# Patient Record
Sex: MALE | Race: WHITE | NOT HISPANIC OR LATINO | Employment: FULL TIME | URBAN - METROPOLITAN AREA
[De-identification: names, ages, dates, MRNs, and addresses within clinical notes are randomized per-mention and may not be internally consistent; named-entity substitution may affect disease eponyms.]

---

## 2017-01-06 ENCOUNTER — OFFICE VISIT (OUTPATIENT)
Dept: FAMILY MEDICINE CLINIC | Facility: CLINIC | Age: 62
End: 2017-01-06

## 2017-01-06 VITALS
HEIGHT: 70 IN | WEIGHT: 190 LBS | OXYGEN SATURATION: 98 % | HEART RATE: 110 BPM | BODY MASS INDEX: 27.2 KG/M2 | SYSTOLIC BLOOD PRESSURE: 118 MMHG | DIASTOLIC BLOOD PRESSURE: 70 MMHG

## 2017-01-06 DIAGNOSIS — M62.831 MUSCLE SPASM OF CALF: Primary | ICD-10-CM

## 2017-01-06 PROCEDURE — 99213 OFFICE O/P EST LOW 20 MIN: CPT | Performed by: FAMILY MEDICINE

## 2017-01-06 RX ORDER — CYCLOBENZAPRINE HCL 10 MG
10 TABLET ORAL NIGHTLY PRN
Qty: 30 TABLET | Refills: 1 | Status: SHIPPED | OUTPATIENT
Start: 2017-01-06 | End: 2018-01-08

## 2017-01-06 NOTE — MR AVS SNAPSHOT
Jimi De La Vega   1/6/2017 1:30 PM   Office Visit    Provider:  Neal Jalloh MD   Department:  [facility]   Dept Phone:  [phone]                Your Full Care Plan              Today's Medication Changes          These changes are accurate as of: 1/6/17  1:41 PM.  If you have any questions, ask your nurse or doctor.               New Medication(s)Ordered:     cyclobenzaprine 10 MG tablet   Commonly known as:  FLEXERIL   Take 1 tablet by mouth At Night As Needed for muscle spasms.            Where to Get Your Medications      These medications were sent to [address] - [address] [address] - [phone]  - [phone]   [address] [facility] [address], [facility] [address]     Phone:  [phone]     cyclobenzaprine 10 MG tablet                  Your Updated Medication List          This list is accurate as of: 1/6/17  1:41 PM.  Always use your most recent med list.                cyclobenzaprine 10 MG tablet   Commonly known as:  FLEXERIL   Take 1 tablet by mouth At Night As Needed for muscle spasms.       hydroxychloroquine 200 MG tablet   Commonly known as:  PLAQUENIL               You Were Diagnosed With        Codes Comments    Muscle spasm of calf    -  Primary ICD-10-CM: M62.831  ICD-9-CM: 728.85       Instructions     None    Patient Instructions History      [employer] Signup     Our records indicate that you have an active [email or web address] account.    You can view your After Visit Summary by going to [facility] and logging in with your [employer] username and password.  If you don't have a [employer] username and password but a parent or guardian has access to your record, the parent or guardian should login with their own [employer] username and password and access your record to view the After Visit Summary.    If you have questions, you can email [email or web address] or call [phone] to talk to our  "MyChart staff.  Remember, MyChart is NOT to be used for urgent needs.  For medical emergencies, dial 911.               Other Info from Your Visit           Allergies     No Known Allergies      Reason for Visit     Spasms C/o right leg spasm from knee to calf muscle. Only happens at night x 3 weeks.      Vital Signs     Blood Pressure Pulse Height Weight Oxygen Saturation Body Mass Index    118/70 110 70\" (177.8 cm) 190 lb (86.2 kg) 98% 27.26 kg/m2    Smoking Status                   Never Smoker           Problems and Diagnoses Noted     Muscle spasm of calf      "

## 2017-01-06 NOTE — PROGRESS NOTES
Subjective   Jimi De La Vega is a 61 y.o. male who is here for   Chief Complaint   Patient presents with   • Spasms     C/o right leg spasm from knee to calf muscle. Only happens at night x 3 weeks.   .     History of Present Illness   Has a tight jolt spasm at night for 1 month right lateral calf muscle    Is a runner  No injury  His overall RLS is nearly resolved since he has been running.    The following portions of the patient's history were reviewed and updated as appropriate: allergies, current medications, past family history, past medical history, past social history, past surgical history and problem list.    Review of Systems    Objective   Physical Exam   Cardiovascular: Intact distal pulses.    Musculoskeletal:        Right knee: Normal.        Right ankle: Normal.        Right lower leg: He exhibits tenderness. He exhibits no bony tenderness, no swelling and no edema.        Legs:  Nursing note and vitals reviewed.      Assessment/Plan   Jimi was seen today for spasms.    Diagnoses and all orders for this visit:    Muscle spasm of calf  -     cyclobenzaprine (FLEXERIL) 10 MG tablet; Take 1 tablet by mouth At Night As Needed for muscle spasms.      Patient Instructions   Take the flexeril 1 h prior to bed with a small bottle to tonic water.      There are no discontinued medications.     Return if symptoms worsen or fail to improve.    Dr. Neal Jalloh  [place], [place]

## 2017-02-06 DIAGNOSIS — Z00.00 ROUTINE ADULT HEALTH MAINTENANCE: Primary | ICD-10-CM

## 2017-02-06 DIAGNOSIS — Z12.5 SCREENING FOR PROSTATE CANCER: ICD-10-CM

## 2017-02-07 ENCOUNTER — LAB (OUTPATIENT)
Dept: FAMILY MEDICINE CLINIC | Facility: CLINIC | Age: 62
End: 2017-02-07

## 2017-02-07 DIAGNOSIS — Z12.5 SCREENING FOR PROSTATE CANCER: ICD-10-CM

## 2017-02-07 DIAGNOSIS — Z00.00 ROUTINE ADULT HEALTH MAINTENANCE: ICD-10-CM

## 2017-02-07 LAB
ALBUMIN SERPL-MCNC: 4.8 G/DL (ref 3.5–5.2)
ALBUMIN/GLOB SERPL: 2.2 G/DL
ALP SERPL-CCNC: 63 U/L (ref 40–129)
ALT SERPL-CCNC: 29 U/L (ref 5–41)
AST SERPL-CCNC: 31 U/L (ref 5–40)
BILIRUB SERPL-MCNC: 1 MG/DL (ref 0.2–1.2)
BUN SERPL-MCNC: 14 MG/DL (ref 8–23)
BUN/CREAT SERPL: 13.3 (ref 7–25)
CALCIUM SERPL-MCNC: 9.6 MG/DL (ref 8.8–10.5)
CHLORIDE SERPL-SCNC: 101 MMOL/L (ref 98–107)
CHOLEST SERPL-MCNC: 210 MG/DL (ref 0–200)
CO2 SERPL-SCNC: 29.9 MMOL/L (ref 22–29)
CREAT SERPL-MCNC: 1.05 MG/DL (ref 0.76–1.27)
ERYTHROCYTE [DISTWIDTH] IN BLOOD BY AUTOMATED COUNT: 11.9 % (ref 11.5–14.5)
GLOBULIN SER CALC-MCNC: 2.2 GM/DL
GLUCOSE SERPL-MCNC: 89 MG/DL (ref 65–99)
HCT VFR BLD AUTO: 48.5 % (ref 42–52)
HDLC SERPL-MCNC: 98 MG/DL (ref 40–60)
HGB BLD-MCNC: 16.4 G/DL (ref 14–18)
LDLC SERPL CALC-MCNC: 98 MG/DL (ref 0–100)
MCH RBC QN AUTO: 30.1 PG (ref 27–31)
MCHC RBC AUTO-ENTMCNC: 33.8 G/DL (ref 31–37)
MCV RBC AUTO: 89 FL (ref 80–94)
PLATELET # BLD AUTO: 224 10*3/MM3 (ref 140–500)
POTASSIUM SERPL-SCNC: 4.3 MMOL/L (ref 3.5–5.2)
PROT SERPL-MCNC: 7 G/DL (ref 6–8.5)
PSA SERPL-MCNC: 0.73 NG/ML (ref 0–4)
RBC # BLD AUTO: 5.45 10*6/MM3 (ref 4.7–6.1)
SODIUM SERPL-SCNC: 143 MMOL/L (ref 136–145)
TRIGL SERPL-MCNC: 72 MG/DL (ref 0–150)
VLDLC SERPL CALC-MCNC: 14.4 MG/DL (ref 8–32)
WBC # BLD AUTO: 6.4 10*3/MM3 (ref 4.8–10.8)

## 2017-02-14 ENCOUNTER — OFFICE VISIT (OUTPATIENT)
Dept: FAMILY MEDICINE CLINIC | Facility: CLINIC | Age: 62
End: 2017-02-14

## 2017-02-14 VITALS
DIASTOLIC BLOOD PRESSURE: 66 MMHG | RESPIRATION RATE: 20 BRPM | TEMPERATURE: 98.9 F | OXYGEN SATURATION: 98 % | WEIGHT: 188 LBS | HEIGHT: 70 IN | SYSTOLIC BLOOD PRESSURE: 108 MMHG | BODY MASS INDEX: 26.92 KG/M2 | HEART RATE: 78 BPM

## 2017-02-14 DIAGNOSIS — Z00.00 ROUTINE ADULT HEALTH MAINTENANCE: Primary | ICD-10-CM

## 2017-02-14 DIAGNOSIS — B35.4 TINEA CORPORIS: ICD-10-CM

## 2017-02-14 DIAGNOSIS — Z12.11 SCREENING FOR COLON CANCER: ICD-10-CM

## 2017-02-14 LAB — HEMOCCULT STL QL IA: NEGATIVE

## 2017-02-14 PROCEDURE — 82274 ASSAY TEST FOR BLOOD FECAL: CPT | Performed by: FAMILY MEDICINE

## 2017-02-14 PROCEDURE — 99396 PREV VISIT EST AGE 40-64: CPT | Performed by: FAMILY MEDICINE

## 2017-02-14 RX ORDER — KETOCONAZOLE 20 MG/G
CREAM TOPICAL EVERY 12 HOURS SCHEDULED
Qty: 30 G | Refills: 1 | Status: SHIPPED | OUTPATIENT
Start: 2017-02-14 | End: 2019-02-14

## 2017-02-14 NOTE — PROGRESS NOTES
"  Chief Complaint   Patient presents with   • Annual Exam     pt is concerened about a spot on his upper thigh and leg pain in right leg.        Subjective   Jimi De La Vega is a 61 y.o. male and is here for a yearly physical exam. The patient reports problems - rheumatoid is stable, running miles 3x a week, new spreading rash on leg.    Do you take any herbs or supplements that were not prescribed by a doctor? no. If so, these will be added to active medication list.    The following portions of the patient's history were reviewed and updated as appropriate: allergies, current medications, past family history, past medical history, past social history, past surgical history and problem list.    Review of Systems  Review of Systems  A comprehensive review of systems was negative.    Physical Exam    Objective   Visit Vitals   • /66 (BP Location: Left arm, Patient Position: Sitting, Cuff Size: Adult)   • Pulse 78   • Temp 98.9 °F (37.2 °C)   • Resp 20   • Ht 70\" (177.8 cm)   • Wt 188 lb (85.3 kg)   • SpO2 98%   • BMI 26.98 kg/m2       General Appearance:    Alert, cooperative, no distress, appears stated age   Head:    Normocephalic, without obvious abnormality, atraumatic   Eyes:    PERRL, conjunctiva/corneas clear, EOM's intact, fundi     benign, both eyes        Ears:    Normal TM's and external ear canals, both ears   Nose:   Nares normal, septum midline, mucosa normal, no drainage    or sinus tenderness   Throat:   Lips, mucosa, and tongue normal; teeth and gums normal   Neck:   Supple, symmetrical, trachea midline, no adenopathy;        thyroid:  No enlargement/tenderness/nodules; no carotid    bruit or JVD   Back:     Symmetric, no curvature, ROM normal, no CVA tenderness   Lungs:     Clear to auscultation bilaterally, respirations unlabored   Chest wall:    No tenderness or deformity   Heart:    Regular rate and rhythm, S1 and S2 normal, no murmur, rub   or gallop   Abdomen:     Soft, non-tender, bowel " sounds active all four quadrants,     no masses, no organomegaly   Genitalia:    Normal male without lesion, discharge or tenderness   Rectal:    Normal tone, normal prostate, no masses or tenderness;    guaiac negative stool   Extremities:   Extremities normal, atraumatic, no cyanosis or edema   Pulses:   2+ and symmetric all extremities   Skin:   Hypopigmentation on hands and knees. No plaques.  5 cm spreading rash right inner thigh   Lymph nodes:   Cervical, supraclavicular, and axillary nodes normal   Neurologic:   CNII-XII intact. Normal strength, sensation and reflexes       throughout          Results for orders placed or performed in visit on 02/14/17   POC FECAL OCCULT BLOOD BY IMMUNOASSAY   Result Value Ref Range    POC OCCULT BLOOD BY IMMUNOASSAY Negative Negative     Assessment/Plan   Healthy male exam.  Jimi was seen today for annual exam.    Diagnoses and all orders for this visit:    Routine adult health maintenance    Screening for colon cancer  -     POC FECAL OCCULT BLOOD BY IMMUNOASSAY    Tinea corporis  -     ketoconazole (NIZORAL) 2 % cream; Apply  topically Every 12 (Twelve) Hours.      1. Labs all look great  2. Patient Counseling:  --Nutrition: Stressed importance of moderation in sodium/caffeine intake, saturated fat and cholesterol.  Discussed caloric balance, sufficient intake of fresh fruits, vegetables, fiber, calcium, iron. Good here  --Discussed the daily use of baby aspirin, if indicated. Not needed  --Exercise: Stressed the importance of regular exercise. Very good here  --Substance Abuse: Discussed cessation/primary prevention of tobacco, alcohol, or other drug use; driving or other dangerous activities under the influence. Could reduce wine   --Dental health: Discussed importance of regular tooth brushing, flossing, and dental visits.utd  --Immunizations reviewed.utd  --Discussed benefits of screening colonoscopy.utd  3. Discussed the patient's BMI with him.  The BMI is in the  acceptable range  4. Follow up in one year   5. UTD on eye exams    There are no discontinued medications.     Dr. Neal Jalloh MD  Family [place], [place]  [facility]

## 2017-07-05 ENCOUNTER — OFFICE VISIT (OUTPATIENT)
Dept: FAMILY MEDICINE CLINIC | Facility: CLINIC | Age: 62
End: 2017-07-05

## 2017-07-05 VITALS
HEIGHT: 70 IN | SYSTOLIC BLOOD PRESSURE: 98 MMHG | TEMPERATURE: 98.3 F | OXYGEN SATURATION: 99 % | DIASTOLIC BLOOD PRESSURE: 64 MMHG | WEIGHT: 190.1 LBS | BODY MASS INDEX: 27.22 KG/M2 | HEART RATE: 61 BPM

## 2017-07-05 DIAGNOSIS — J06.9 ACUTE URI: Primary | ICD-10-CM

## 2017-07-05 PROCEDURE — 99213 OFFICE O/P EST LOW 20 MIN: CPT | Performed by: FAMILY MEDICINE

## 2017-07-05 RX ORDER — AZITHROMYCIN 250 MG/1
TABLET, FILM COATED ORAL
Qty: 6 TABLET | Refills: 0 | Status: SHIPPED | OUTPATIENT
Start: 2017-07-05 | End: 2018-01-08

## 2017-07-05 NOTE — PROGRESS NOTES
Subjective   Jimi De La Vega is a 62 y.o. male who is here for   Chief Complaint   Patient presents with   • Generalized Body Aches     started Monday    • Diarrhea   • Fatigue   • Bite     tick bite two weeks ago, doesnt know if it has anything to do with him feeling poorly   .     History of Present Illness   Fever: Patient presents with suspected fevers but not measured at home and hot and cold spells. He has had the fever for 3 days.  Symptoms have been unchanged. Symptoms associated with the fever include body aches, chills, fatigue and URI symptoms, and patient denies abdominal pain, nausea and vomiting.. Symptoms are worse at no particular time of day.  Symptoms have been unchanged since that time. Patient has been restless. Appetite has been stable. Urine output has been stable. He has associated symptoms of   He has tried to alleviate the symptoms with acetaminophen with moderate relief.   The patient has immunocompromised state of plaquenil use. :not applicable. Exposure to tobacco: no. Exposure to someone else at home w/similar symptoms:no Exposure to someone else at /school/work:no.    Just achy all over  Tired  Sore throat    Tick bite on lower left abd wall, below belt line  Was not well attached and non blood engorged.  Small and brown tick    The following portions of the patient's history were reviewed and updated as appropriate: allergies, current medications, past family history, past medical history, past social history, past surgical history and problem list.    Review of Systems    Objective   Physical Exam   Constitutional: He appears well-developed and well-nourished. No distress.   HENT:   Right Ear: External ear normal.   Left Ear: External ear normal.   Mouth/Throat: Posterior oropharyngeal erythema present.   Neck: No thyromegaly present.   Cardiovascular: Normal rate.    Pulmonary/Chest: Effort normal.   Lymphadenopathy:     He has no cervical adenopathy.   Skin: No rash  noted.   Nursing note and vitals reviewed.      Assessment/Plan   Jimi was seen today for generalized body aches, diarrhea, fatigue and bite.    Diagnoses and all orders for this visit:    Acute URI  -     azithromycin (ZITHROMAX) 250 MG tablet; Take 2 tablets the first day, then 1 tablet daily for 4 days.      Patient Instructions   Most likely a viral pharyngitis  Doubtful tick bite plays any part  Hold Z Anuj a few days to see if symptoms resolve on their own      There are no discontinued medications.     No Follow-up on file.    Dr. Neal Jalloh  [place], [place]

## 2017-07-05 NOTE — PATIENT INSTRUCTIONS
Most likely a viral pharyngitis  Doubtful tick bite plays any part  Hold Z Anuj a few days to see if symptoms resolve on their own

## 2018-01-08 ENCOUNTER — OFFICE VISIT (OUTPATIENT)
Dept: FAMILY MEDICINE CLINIC | Facility: CLINIC | Age: 63
End: 2018-01-08

## 2018-01-08 VITALS
SYSTOLIC BLOOD PRESSURE: 98 MMHG | HEART RATE: 67 BPM | RESPIRATION RATE: 18 BRPM | DIASTOLIC BLOOD PRESSURE: 72 MMHG | OXYGEN SATURATION: 99 % | BODY MASS INDEX: 27.49 KG/M2 | HEIGHT: 70 IN | WEIGHT: 192 LBS

## 2018-01-08 DIAGNOSIS — J01.10 ACUTE NON-RECURRENT FRONTAL SINUSITIS: ICD-10-CM

## 2018-01-08 DIAGNOSIS — R68.89 FLU-LIKE SYMPTOMS: Primary | ICD-10-CM

## 2018-01-08 LAB
EXPIRATION DATE: NORMAL
FLUAV AG NPH QL: NEGATIVE
FLUBV AG NPH QL: NEGATIVE
INTERNAL CONTROL: NORMAL
Lab: NORMAL

## 2018-01-08 PROCEDURE — 99213 OFFICE O/P EST LOW 20 MIN: CPT | Performed by: NURSE PRACTITIONER

## 2018-01-08 PROCEDURE — 87804 INFLUENZA ASSAY W/OPTIC: CPT | Performed by: NURSE PRACTITIONER

## 2018-01-08 RX ORDER — AZITHROMYCIN 250 MG/1
TABLET, FILM COATED ORAL
Qty: 6 TABLET | Refills: 0 | Status: SHIPPED | OUTPATIENT
Start: 2018-01-11 | End: 2018-02-05

## 2018-01-08 NOTE — PROGRESS NOTES
"Subjective   Jimi De La Vega is a 62 y.o. male.     Chief Complaint   Patient presents with   • Sinusitis     started x 2 days ago, drainage,  fatigue      Social History   Substance Use Topics   • Smoking status: Never Smoker   • Smokeless tobacco: Never Used   • Alcohol use 8.4 oz/week     14 Glasses of wine per week      Comment: daily       URI    This is a new problem. The current episode started in the past 7 days (2 days). The problem has been gradually worsening. There has been no fever. Associated symptoms include congestion, a plugged ear sensation, sinus pain and sneezing. Pertinent negatives include no coughing, rhinorrhea, sore throat or wheezing. Treatments tried: stahist.     Review of Systems   Constitutional: Positive for chills and fatigue. Negative for fever.   HENT: Positive for congestion, sinus pain and sneezing. Negative for rhinorrhea and sore throat.    Respiratory: Negative for cough, shortness of breath and wheezing.    All other systems reviewed and are negative.    Physical Exam   Gen: mildly ill appearing, alert  Ears: Tm's bulging without redness  Nose:  Congestion  Throat:  Red without exudate, some drainage, tonsils okay  Neck: no LAD  Lung: good air movement, regular RR  Heart: RR without murmur  Skin: no rash.    The following portions of the patient's history were reviewed and updated as appropriate: allergies, current medications,past medical hx, family hx, past social history an...    Chief Complaint   Patient presents with   • Sinusitis     started x 2 days ago, drainage,  fatigue        Vitals:    01/08/18 0938   BP: 98/72   BP Location: Left arm   Patient Position: Sitting   Cuff Size: Large Adult   Pulse: 67   Resp: 18   SpO2: 99%   Weight: 87.1 kg (192 lb)   Height: 177.8 cm (70\")       Assessment/Plan   Problems Addressed this Visit     None      Visit Diagnoses     Flu-like symptoms    -  Primary    Relevant Orders    POC Influenza A / B (Completed)    Acute non-recurrent " frontal sinusitis        Relevant Medications    azithromycin (ZITHROMAX) 250 MG tablet (Start on 1/11/2018)        Results for orders placed or performed in visit on 01/08/18   POC Influenza A / B   Result Value Ref Range    Rapid Influenza A Ag NEGATIVE     Rapid Influenza B Ag NEGATIVE     Internal Control Passed Passed    Lot Number [id number]     Expiration Date 2019/2      May fill abx on Friday if no improvement or worsening by then.        Tylenol or Advil as needed for pain, fever, muscle aches  Plenty of fluids  Hand washing discussed  Off work or school note given if needed.  Warm tea for throat.      Ashly MOSLEY  Family Practice  [place], [place]  [facility]

## 2018-02-05 ENCOUNTER — OFFICE VISIT (OUTPATIENT)
Dept: RETAIL CLINIC | Facility: CLINIC | Age: 63
End: 2018-02-05

## 2018-02-05 VITALS
TEMPERATURE: 100.1 F | SYSTOLIC BLOOD PRESSURE: 123 MMHG | OXYGEN SATURATION: 98 % | HEART RATE: 72 BPM | DIASTOLIC BLOOD PRESSURE: 70 MMHG

## 2018-02-05 DIAGNOSIS — J10.1 INFLUENZA A: ICD-10-CM

## 2018-02-05 DIAGNOSIS — M25.50 ARTHRALGIA, UNSPECIFIED JOINT: Primary | ICD-10-CM

## 2018-02-05 LAB
EXPIRATION DATE: ABNORMAL
FLUAV AG NPH QL: ABNORMAL
FLUBV AG NPH QL: ABNORMAL
INTERNAL CONTROL: ABNORMAL
Lab: ABNORMAL

## 2018-02-05 PROCEDURE — 87804 INFLUENZA ASSAY W/OPTIC: CPT | Performed by: NURSE PRACTITIONER

## 2018-02-05 PROCEDURE — 99213 OFFICE O/P EST LOW 20 MIN: CPT | Performed by: NURSE PRACTITIONER

## 2018-02-05 RX ORDER — OSELTAMIVIR PHOSPHATE 75 MG/1
75 CAPSULE ORAL 2 TIMES DAILY
Qty: 10 CAPSULE | Refills: 0 | Status: SHIPPED | OUTPATIENT
Start: 2018-02-05 | End: 2018-02-10

## 2018-02-06 NOTE — PROGRESS NOTES
Subjective     Jimi De La Vega is a 62 y.o.. male.     Flu Symptoms   This is a new problem. The current episode started in the past 7 days. The problem has been unchanged. Associated symptoms include abdominal pain, arthralgias, congestion, coughing, fatigue, headaches and nausea. Pertinent negatives include no fever, sore throat or vomiting. Treatments tried: cough/cold/flu otc meds. The treatment provided no relief.       The following portions of the patient's history were reviewed and updated as appropriate: allergies, current medications, past family history, past medical history, past social history, past surgical history and problem list.    Review of Systems   Constitutional: Positive for fatigue. Negative for fever.   HENT: Positive for congestion and rhinorrhea. Negative for ear pain and sore throat.    Respiratory: Positive for cough.    Gastrointestinal: Positive for abdominal pain, diarrhea and nausea. Negative for vomiting.   Genitourinary: Negative.    Musculoskeletal: Positive for arthralgias.   Neurological: Positive for headaches.       Objective     Vitals:    02/05/18 1944   BP: 123/70   Pulse: 72   Temp: 100.1 °F (37.8 °C)   TempSrc: Oral   SpO2: 98%       Physical Exam   Constitutional: He is oriented to person, place, and time. He appears well-developed and well-nourished.   HENT:   Head: Normocephalic and atraumatic.   Right Ear: Tympanic membrane is not erythematous.   Left Ear: Tympanic membrane is not erythematous.   Nose: Mucosal edema present. Right sinus exhibits no maxillary sinus tenderness and no frontal sinus tenderness. Left sinus exhibits no maxillary sinus tenderness and no frontal sinus tenderness.   Mouth/Throat: Oropharynx is clear and moist.   PND  John. TM's with clear fluid noted   Eyes: Conjunctivae are normal. Pupils are equal, round, and reactive to light.   Cardiovascular: Normal rate and regular rhythm.    No murmur heard.  Pulmonary/Chest: Effort normal. He has no  wheezes. He has no rhonchi. He has no rales.   Musculoskeletal: Normal range of motion.   Lymphadenopathy:     He has no cervical adenopathy.   Neurological: He is alert and oriented to person, place, and time.   Skin: Skin is warm and dry.   Vitals reviewed.      Lab Results (last 24 hours)     Procedure Component Value Units Date/Time    POC Influenza A / B [[id number]]  (Abnormal) Collected:  02/05/18 1959    Specimen:  Swab Updated:  02/05/18 2000     Rapid Influenza A Ag pos     Rapid Influenza B Ag neg     Internal Control Passed     Lot Number [id number]     Expiration Date 2019-12          Assessment/Plan   Jimi was seen today for flu symptoms.    Diagnoses and all orders for this visit:    Arthralgia, unspecified joint  -     POC Influenza A / B    Influenza A  -     oseltamivir (TAMIFLU) 75 MG capsule; Take 1 capsule by mouth 2 (Two) Times a Day for 5 days.        Patient Instructions   Influenza, Adult  Influenza, more commonly known as “the flu,” is a viral infection that primarily affects the respiratory tract. The respiratory tract includes organs that help you breathe, such as the lungs, nose, and throat. The flu causes many common cold symptoms, as well as a high fever and body aches.  The flu spreads easily from person to person (is contagious). Getting a flu shot (influenza vaccination) every year is the best way to prevent influenza.  What are the causes?  Influenza is caused by a virus. You can catch the virus by:  · Breathing in droplets from an infected person's cough or sneeze.  · Touching something that was recently contaminated with the virus and then touching your mouth, nose, or eyes.  What increases the risk?  The following factors may make you more likely to get the flu:  · Not cleaning your hands frequently with soap and water or alcohol-based hand .  · Having close contact with many people during cold and flu season.  · Touching your mouth, eyes, or nose without washing or  sanitizing your hands first.  · Not drinking enough fluids or not eating a healthy diet.  · Not getting enough sleep or exercise.  · Being under a high amount of stress.  · Not getting a yearly (annual) flu shot.  You may be at a higher risk of complications from the flu, such as a severe lung infection (pneumonia), if you:  · Are over the age of 65.  · Are pregnant.  · Have a weakened disease-fighting system (immune system). You may have a weakened immune system if you:  ¨ Have HIV or AIDS.  ¨ Are undergoing chemotherapy.  ¨ Are taking medicines that reduce the activity of (suppress) the immune system.  · Have a long-term (chronic) illness, such as heart disease, kidney disease, diabetes, or lung disease.  · Have a liver disorder.  · Are obese.  · Have anemia.  What are the signs or symptoms?  Symptoms of this condition typically last 4-10 days and may include:  · Fever.  · Chills.  · Headache, body aches, or muscle aches.  · Sore throat.  · Cough.  · Runny or congested nose.  · Chest discomfort and cough.  · Poor appetite.  · Weakness or tiredness (fatigue).  · Dizziness.  · Nausea or vomiting.  How is this diagnosed?  This condition may be diagnosed based on your medical history and a physical exam. Your health care provider may do a nose or throat swab test to confirm the diagnosis.  How is this treated?  If influenza is detected early, you can be treated with antiviral medicine that can reduce the length of your illness and the severity of your symptoms. This medicine may be given by mouth (orally) or through an IV tube that is inserted in one of your veins.  The goal of treatment is to relieve symptoms by taking care of yourself at home. This may include taking over-the-counter medicines, drinking plenty of fluids, and adding humidity to the air in your home.  In some cases, influenza goes away on its own. Severe influenza or complications from influenza may be treated in a hospital.  Follow these instructions  at home:  · Take over-the-counter and prescription medicines only as told by your health care provider.  · Use a cool mist humidifier to add humidity to the air in your home. This can make breathing easier.  · Rest as needed.  · Drink enough fluid to keep your urine clear or pale yellow.  · Cover your mouth and nose when you cough or sneeze.  · Wash your hands with soap and water often, especially after you cough or sneeze. If soap and water are not available, use hand .  · Stay home from work or school as told by your health care provider. Unless you are visiting your health care provider, try to avoid leaving home until your fever has been gone for 24 hours without the use of medicine.  · Keep all follow-up visits as told by your health care provider. This is important.  How is this prevented?  · Getting an annual flu shot is the best way to avoid getting the flu. You may get the flu shot in late summer, fall, or winter. Ask your health care provider when you should get your flu shot.  · Wash your hands often or use hand  often.  · Avoid contact with people who are sick during cold and flu season.  · Eat a healthy diet, drink plenty of fluids, get enough sleep, and exercise regularly.  Contact a health care provider if:  · You develop new symptoms.  · You have:  ¨ Chest pain.  ¨ Diarrhea.  ¨ A fever.  · Your cough gets worse.  · You produce more mucus.  · You feel nauseous or you vomit.  Get help right away if:  · You develop shortness of breath or difficulty breathing.  · Your skin or nails turn a bluish color.  · You have severe pain or stiffness in your neck.  · You develop a sudden headache or sudden pain in your face or ear.  · You cannot stop vomiting.  This information is not intended to replace advice given to you by your health care provider. Make sure you discuss any questions you have with your health care provider.  Document Released: 12/15/2001 Document Revised: 05/25/2017 Document  Reviewed: 10/11/2016  [employer] Interactive Patient Education © 2017 [employer]        Return if symptoms worsen or fail to improve with urgent care/ER.

## 2018-02-06 NOTE — PATIENT INSTRUCTIONS

## 2018-02-13 DIAGNOSIS — Z00.00 ROUTINE ADULT HEALTH MAINTENANCE: Primary | ICD-10-CM

## 2018-02-14 LAB
ALBUMIN SERPL-MCNC: 4.2 G/DL (ref 3.5–5.2)
ALBUMIN/GLOB SERPL: 2.1 G/DL
ALP SERPL-CCNC: 63 U/L (ref 40–129)
ALT SERPL-CCNC: 39 U/L (ref 5–41)
APPEARANCE UR: CLEAR
AST SERPL-CCNC: 32 U/L (ref 5–40)
BACTERIA #/AREA URNS HPF: ABNORMAL /HPF
BILIRUB SERPL-MCNC: 0.8 MG/DL (ref 0.2–1.2)
BILIRUB UR QL STRIP: NEGATIVE
BUN SERPL-MCNC: 15 MG/DL (ref 8–23)
BUN/CREAT SERPL: 16.1 (ref 7–25)
CALCIUM SERPL-MCNC: 8.6 MG/DL (ref 8.8–10.5)
CHLORIDE SERPL-SCNC: 102 MMOL/L (ref 98–107)
CHOLEST SERPL-MCNC: 170 MG/DL (ref 0–200)
CO2 SERPL-SCNC: 30.2 MMOL/L (ref 22–29)
COLOR UR: YELLOW
CREAT SERPL-MCNC: 0.93 MG/DL (ref 0.76–1.27)
EPI CELLS #/AREA URNS HPF: ABNORMAL /HPF
ERYTHROCYTE [DISTWIDTH] IN BLOOD BY AUTOMATED COUNT: 12.1 % (ref 11.5–14.5)
GFR SERPLBLD CREATININE-BSD FMLA CKD-EPI: 100 ML/MIN/1.73
GFR SERPLBLD CREATININE-BSD FMLA CKD-EPI: 83 ML/MIN/1.73
GLOBULIN SER CALC-MCNC: 2 GM/DL
GLUCOSE SERPL-MCNC: 88 MG/DL (ref 65–99)
GLUCOSE UR QL: NEGATIVE
HCT VFR BLD AUTO: 44.2 % (ref 42–52)
HDLC SERPL-MCNC: 61 MG/DL (ref 40–60)
HGB BLD-MCNC: 15.2 G/DL (ref 14–18)
HGB UR QL STRIP: ABNORMAL
KETONES UR QL STRIP: NEGATIVE
LDLC SERPL CALC-MCNC: 91 MG/DL (ref 0–100)
LDLC/HDLC SERPL: 1.5 {RATIO}
LEUKOCYTE ESTERASE UR QL STRIP: NEGATIVE
MCH RBC QN AUTO: 30.7 PG (ref 27–31)
MCHC RBC AUTO-ENTMCNC: 34.4 G/DL (ref 31–37)
MCV RBC AUTO: 89.3 FL (ref 80–94)
NITRITE UR QL STRIP: NEGATIVE
PH UR STRIP: 6 [PH] (ref 4.5–8)
PLATELET # BLD AUTO: 251 10*3/MM3 (ref 140–500)
POTASSIUM SERPL-SCNC: 4.1 MMOL/L (ref 3.5–5.2)
PROT SERPL-MCNC: 6.2 G/DL (ref 6–8.5)
PROT UR QL STRIP: NEGATIVE
PSA SERPL-MCNC: 0.8 NG/ML (ref 0–4)
RBC # BLD AUTO: 4.95 10*6/MM3 (ref 4.7–6.1)
RBC #/AREA URNS HPF: ABNORMAL /HPF
SODIUM SERPL-SCNC: 141 MMOL/L (ref 136–145)
SP GR UR: 1.02 (ref 1–1.03)
TRIGL SERPL-MCNC: 88 MG/DL (ref 0–150)
TSH SERPL DL<=0.005 MIU/L-ACNC: 1.15 MIU/ML (ref 0.27–4.2)
UROBILINOGEN UR STRIP-MCNC: ABNORMAL MG/DL
VLDLC SERPL CALC-MCNC: 17.6 MG/DL (ref 8–32)
WBC # BLD AUTO: 7.3 10*3/MM3 (ref 4.8–10.8)
WBC #/AREA URNS HPF: ABNORMAL /HPF

## 2018-02-22 ENCOUNTER — OFFICE VISIT (OUTPATIENT)
Dept: FAMILY MEDICINE CLINIC | Facility: CLINIC | Age: 63
End: 2018-02-22

## 2018-02-22 VITALS
OXYGEN SATURATION: 93 % | DIASTOLIC BLOOD PRESSURE: 64 MMHG | WEIGHT: 189.6 LBS | SYSTOLIC BLOOD PRESSURE: 120 MMHG | HEART RATE: 70 BPM | HEIGHT: 69 IN | TEMPERATURE: 98.4 F | BODY MASS INDEX: 28.08 KG/M2

## 2018-02-22 DIAGNOSIS — M75.52 BURSITIS OF LEFT SHOULDER: ICD-10-CM

## 2018-02-22 DIAGNOSIS — Z12.11 SCREENING FOR COLON CANCER: ICD-10-CM

## 2018-02-22 DIAGNOSIS — Z12.5 SCREENING FOR PROSTATE CANCER: ICD-10-CM

## 2018-02-22 DIAGNOSIS — M05.79 RHEUMATOID ARTHRITIS INVOLVING MULTIPLE SITES WITH POSITIVE RHEUMATOID FACTOR (HCC): ICD-10-CM

## 2018-02-22 DIAGNOSIS — Z00.00 ROUTINE ADULT HEALTH MAINTENANCE: Primary | ICD-10-CM

## 2018-02-22 PROCEDURE — 99396 PREV VISIT EST AGE 40-64: CPT | Performed by: FAMILY MEDICINE

## 2018-02-22 PROCEDURE — 82274 ASSAY TEST FOR BLOOD FECAL: CPT | Performed by: FAMILY MEDICINE

## 2018-02-22 NOTE — PATIENT INSTRUCTIONS
Results for orders placed or performed in visit on 02/13/18   PSA Screen   Result Value Ref Range    PSA 0.800 0.000 - 4.000 ng/mL   Comprehensive metabolic panel   Result Value Ref Range    Glucose 88 65 - 99 mg/dL    BUN 15 8 - 23 mg/dL    Creatinine 0.93 0.76 - 1.27 mg/dL    eGFR Non African Am 83 >60 mL/min/1.73    eGFR African Am 100 >60 mL/min/1.73    BUN/Creatinine Ratio 16.1 7.0 - 25.0    Sodium 141 136 - 145 mmol/L    Potassium 4.1 3.5 - 5.2 mmol/L    Chloride 102 98 - 107 mmol/L    Total CO2 30.2 (H) 22.0 - 29.0 mmol/L    Calcium 8.6 (L) 8.8 - 10.5 mg/dL    Total Protein 6.2 6.0 - 8.5 g/dL    Albumin 4.20 3.50 - 5.20 g/dL    Globulin 2.0 gm/dL    A/G Ratio 2.1 g/dL    Total Bilirubin 0.8 0.2 - 1.2 mg/dL    Alkaline Phosphatase 63 40 - 129 U/L    AST (SGOT) 32 5 - 40 U/L    ALT (SGPT) 39 5 - 41 U/L   CBC (No Diff)   Result Value Ref Range    WBC 7.30 4.80 - 10.80 10*3/mm3    RBC 4.95 4.70 - 6.10 10*6/mm3    Hemoglobin 15.2 14.0 - 18.0 g/dL    Hematocrit 44.2 42.0 - 52.0 %    MCV 89.3 80.0 - 94.0 fL    MCH 30.7 27.0 - 31.0 pg    MCHC 34.4 31.0 - 37.0 g/dL    RDW 12.1 11.5 - 14.5 %    Platelets 251 140 - 500 10*3/mm3   TSH   Result Value Ref Range    TSH 1.150 0.270 - 4.200 mIU/mL   Urinalysis With / Microscopic If Indicated - Urine, Clean Catch   Result Value Ref Range    Specific Gravity, UA 1.024 1.003 - 1.030    pH, UA 6.0 4.5 - 8.0    Color, UA Yellow     Appearance, UA Clear Clear    Leukocytes, UA Negative Negative    Protein Negative Negative    Glucose, UA Negative Negative    Ketones Negative     Blood, UA Trace (A) Negative    Bilirubin, UA Negative Negative    Urobilinogen, UA Comment     Nitrite, UA Negative Negative   Lipid Panel With LDL / HDL Ratio   Result Value Ref Range    Total Cholesterol 170 0 - 200 mg/dL    Triglycerides 88 0 - 150 mg/dL    HDL Cholesterol 61 (H) 40 - 60 mg/dL    VLDL Cholesterol 17.6 8 - 32 mg/dL    LDL Cholesterol  91 0 - 100 mg/dL    LDL/HDL Ratio 1.50    Microscopic  Examination   Result Value Ref Range    WBC, UA Comment None Seen /hpf    RBC, UA 3-5 (A) None Seen /hpf    Epithelial Cells (non renal) Comment /hpf    Bacteria, UA Comment None Seen /hpf

## 2018-02-22 NOTE — PROGRESS NOTES
"  Chief Complaint   Patient presents with   • Annual Exam   • Shoulder Pain     when sleeping on left shoulder x 3 months        Subjective   Jimi De La Vega is a 62 y.o. male and is here for a yearly physical exam. The patient reports problems - left shoulder.    Do you take any herbs or supplements that were not prescribed by a doctor? yes. If so, these will be added to active medication list.    The following portions of the patient's history were reviewed and updated as appropriate: allergies, current medications, past family history, past medical history, past social history, past surgical history and problem list.    Social and Family and Surgical History reviewed and updated today, see Rooming tab.    Health History, Preventive Measures and Vaccination flow sheets reviewed and updated today.    Patient's current medical chart in Epic; including previous office notes, imaging, labs, specialist's evaluation either in notes or in Media tab reviewed today.    Other pertinent medical information also reviewed thru Care Everywhere function is also reviewed today.    Review of Systems  Review of Systems  Musculoskeletal:positive for stiff joints    Vitals:    02/22/18 0800   BP: 120/64   BP Location: Left arm   Patient Position: Sitting   Pulse: 70   Temp: 98.4 °F (36.9 °C)   SpO2: 93%   Weight: 86 kg (189 lb 9.6 oz)   Height: 175.3 cm (69\")       General Appearance:  Alert, cooperative, no distress, appears stated age   Head:  Normocephalic, without obvious abnormality, atraumatic   Eyes:  PERRL, conjunctiva/corneas clear, EOM's intact.   Ears:  Normal TM's and external ear canals, both ears   Nose: Nares normal, septum midline, mucosa normal, no drainage or sinus tenderness   Throat: Lips, mucosa, and tongue normal; teeth and gums normal   Neck: Supple, symmetrical, trachea midline, no adenopathy;   thyroid: No enlargement/tenderness/nodules; no carotid  bruit   Back:  Symmetric, no curvature, ROM normal, no CVA " tenderness   Lungs:  Clear to auscultation bilaterally, respirations unlabored   Chest wall:  No tenderness or deformity   Heart:  Regular rate and rhythm, S1 and S2 normal, no murmur, rub or gallop   Abdomen:  Soft, non-tender, bowel sounds active all four quadrants,   no masses, no organomegaly   Rectal: Normal, prostate normal       Extremities: L shoulder tender subacromial area,bursitis   Pulses: 2+ and symmetric all extremities   Skin: Skin color, texture, turgor normal, no rashes or lesions   Lymph nodes: Cervical, supraclavicular, and axillary nodes normal   Neurologic: CNII-XII intact. Normal strength, sensation and reflexes   throughout            Assessment/Plan   Healthy male exam.  Jimi was seen today for annual exam and shoulder pain.    Diagnoses and all orders for this visit:    Routine adult health maintenance  -     Lipid Panel; Future  -     CBC (No Diff); Future  -     Comprehensive Metabolic Panel; Future  -     TSH; Future  -     Urinalysis With / Microscopic If Indicated - Urine, Clean Catch; Future    Rheumatoid arthritis involving multiple sites with positive rheumatoid factor  -     CBC (No Diff); Future  -     Comprehensive Metabolic Panel; Future    Screening for colon cancer  -     POC FECAL OCCULT BLOOD BY IMMUNOASSAY    Screening for prostate cancer  -     PSA Screen; Future    Bursitis of left shoulder      1. Labs ok.  Come back for a left shoulder injection  2. Patient Counseling:  --Nutrition: Stressed importance of moderation in sodium/caffeine intake, saturated fat and cholesterol.  Discussed caloric balance, sufficient intake of fresh fruits, vegetables, fiber, calcium, iron.ok  --  --Exercise: Stressed the importance of regular exercise. Good here  --Substance Abuse: Discussed cessation/primary prevention of tobacco, alcohol, or other drug use; driving or other dangerous activities under the influence. ok   --Dental health: Discussed importance of regular tooth brushing,  flossing, and dental visits.utd  -- suggested having eyes and vision checked if needed or past due.  --Immunizations reviewed.  --Discussed benefits of screening colonoscopy.utd  3. Discussed the patient's BMI with him.  The BMI is in the acceptable range  4. Follow up in 2 weeks    Patient Instructions     Results for orders placed or performed in visit on 02/13/18   PSA Screen   Result Value Ref Range    PSA 0.800 0.000 - 4.000 ng/mL   Comprehensive metabolic panel   Result Value Ref Range    Glucose 88 65 - 99 mg/dL    BUN 15 8 - 23 mg/dL    Creatinine 0.93 0.76 - 1.27 mg/dL    eGFR Non African Am 83 >60 mL/min/1.73    eGFR African Am 100 >60 mL/min/1.73    BUN/Creatinine Ratio 16.1 7.0 - 25.0    Sodium 141 136 - 145 mmol/L    Potassium 4.1 3.5 - 5.2 mmol/L    Chloride 102 98 - 107 mmol/L    Total CO2 30.2 (H) 22.0 - 29.0 mmol/L    Calcium 8.6 (L) 8.8 - 10.5 mg/dL    Total Protein 6.2 6.0 - 8.5 g/dL    Albumin 4.20 3.50 - 5.20 g/dL    Globulin 2.0 gm/dL    A/G Ratio 2.1 g/dL    Total Bilirubin 0.8 0.2 - 1.2 mg/dL    Alkaline Phosphatase 63 40 - 129 U/L    AST (SGOT) 32 5 - 40 U/L    ALT (SGPT) 39 5 - 41 U/L   CBC (No Diff)   Result Value Ref Range    WBC 7.30 4.80 - 10.80 10*3/mm3    RBC 4.95 4.70 - 6.10 10*6/mm3    Hemoglobin 15.2 14.0 - 18.0 g/dL    Hematocrit 44.2 42.0 - 52.0 %    MCV 89.3 80.0 - 94.0 fL    MCH 30.7 27.0 - 31.0 pg    MCHC 34.4 31.0 - 37.0 g/dL    RDW 12.1 11.5 - 14.5 %    Platelets 251 140 - 500 10*3/mm3   TSH   Result Value Ref Range    TSH 1.150 0.270 - 4.200 mIU/mL   Urinalysis With / Microscopic If Indicated - Urine, Clean Catch   Result Value Ref Range    Specific Gravity, UA 1.024 1.003 - 1.030    pH, UA 6.0 4.5 - 8.0    Color, UA Yellow     Appearance, UA Clear Clear    Leukocytes, UA Negative Negative    Protein Negative Negative    Glucose, UA Negative Negative    Ketones Negative     Blood, UA Trace (A) Negative    Bilirubin, UA Negative Negative    Urobilinogen, UA Comment      Nitrite, UA Negative Negative   Lipid Panel With LDL / HDL Ratio   Result Value Ref Range    Total Cholesterol 170 0 - 200 mg/dL    Triglycerides 88 0 - 150 mg/dL    HDL Cholesterol 61 (H) 40 - 60 mg/dL    VLDL Cholesterol 17.6 8 - 32 mg/dL    LDL Cholesterol  91 0 - 100 mg/dL    LDL/HDL Ratio 1.50    Microscopic Examination   Result Value Ref Range    WBC, UA Comment None Seen /hpf    RBC, UA 3-5 (A) None Seen /hpf    Epithelial Cells (non renal) Comment /hpf    Bacteria, UA Comment None Seen /hpf           There are no discontinued medications.     Dr. Neal Jalloh MD  [place], [place]  [facility]

## 2018-02-23 PROBLEM — M62.831 MUSCLE SPASM OF CALF: Status: RESOLVED | Noted: 2017-01-06 | Resolved: 2018-02-23

## 2018-02-23 PROBLEM — M75.52 BURSITIS OF LEFT SHOULDER: Status: ACTIVE | Noted: 2018-02-23

## 2018-02-23 LAB — HEMOCCULT STL QL IA: NEGATIVE

## 2018-03-09 ENCOUNTER — PROCEDURE VISIT (OUTPATIENT)
Dept: FAMILY MEDICINE CLINIC | Facility: CLINIC | Age: 63
End: 2018-03-09

## 2018-03-09 VITALS
SYSTOLIC BLOOD PRESSURE: 110 MMHG | WEIGHT: 193.5 LBS | HEIGHT: 69 IN | OXYGEN SATURATION: 95 % | HEART RATE: 65 BPM | DIASTOLIC BLOOD PRESSURE: 70 MMHG | BODY MASS INDEX: 28.66 KG/M2

## 2018-03-09 DIAGNOSIS — M75.52 BURSITIS OF LEFT SHOULDER: Primary | ICD-10-CM

## 2018-03-09 DIAGNOSIS — M05.79 RHEUMATOID ARTHRITIS INVOLVING MULTIPLE SITES WITH POSITIVE RHEUMATOID FACTOR (HCC): ICD-10-CM

## 2018-03-09 PROCEDURE — 20610 DRAIN/INJ JOINT/BURSA W/O US: CPT | Performed by: FAMILY MEDICINE

## 2018-03-09 RX ORDER — METHYLPREDNISOLONE ACETATE 80 MG/ML
80 INJECTION, SUSPENSION INTRA-ARTICULAR; INTRALESIONAL; INTRAMUSCULAR; SOFT TISSUE ONCE
Status: COMPLETED | OUTPATIENT
Start: 2018-03-09 | End: 2018-03-09

## 2018-03-09 RX ADMIN — METHYLPREDNISOLONE ACETATE 80 MG: 80 INJECTION, SUSPENSION INTRA-ARTICULAR; INTRALESIONAL; INTRAMUSCULAR; SOFT TISSUE at 11:03

## 2018-03-09 NOTE — PROGRESS NOTES
Procedure   Arthrocentesis  Date/Time: 3/9/2018 10:42 AM  Performed by: LINDSAY RODGERS  Authorized by: LINDSAY RODGERS   Consent: Verbal consent obtained. Written consent not obtained.  Risks and benefits: risks, benefits and alternatives were discussed  Consent given by: patient  Patient understanding: patient states understanding of the procedure being performed  Patient identity confirmed: verbally with patient  Indications: pain   Body area: shoulder  Joint: left subacromial bursa  Local anesthesia used: yes    Anesthesia:  Local anesthesia used: yes  Local Anesthetic: topical anesthetic    Sedation:  Patient sedated: no  Preparation: Patient was prepped and draped in the usual sterile fashion.  Needle size: 22 G  Ultrasound guidance: no  Approach: lateral  Methylprednisolone amount: 80 mg  Lidocaine 1% amount: 5 mL  Patient tolerance: Patient tolerated the procedure well with no immediate complications  Comments: Jimi comes in for a scheduled bursa injection on left subacromial bursa  Has been tender for 4 months  O/w his rheumatoid is stable.

## 2018-06-29 ENCOUNTER — OFFICE VISIT (OUTPATIENT)
Dept: FAMILY MEDICINE CLINIC | Facility: CLINIC | Age: 63
End: 2018-06-29

## 2018-06-29 VITALS
DIASTOLIC BLOOD PRESSURE: 60 MMHG | OXYGEN SATURATION: 97 % | TEMPERATURE: 98.1 F | HEIGHT: 69 IN | WEIGHT: 185.5 LBS | BODY MASS INDEX: 27.47 KG/M2 | SYSTOLIC BLOOD PRESSURE: 98 MMHG | HEART RATE: 83 BPM

## 2018-06-29 DIAGNOSIS — J01.00 ACUTE NON-RECURRENT MAXILLARY SINUSITIS: Primary | ICD-10-CM

## 2018-06-29 PROCEDURE — 99213 OFFICE O/P EST LOW 20 MIN: CPT | Performed by: FAMILY MEDICINE

## 2018-06-29 RX ORDER — AMOXICILLIN 500 MG/1
500 TABLET, FILM COATED ORAL 3 TIMES DAILY
Qty: 21 TABLET | Refills: 0 | Status: SHIPPED | OUTPATIENT
Start: 2018-06-29 | End: 2018-07-06

## 2018-06-29 NOTE — PROGRESS NOTES
"  Chief Complaint   Patient presents with   • Nasal Congestion     x 2 weeks    • Cough       Upper Respiratory Infection: Patient complains of symptoms of a URI, possible sinusitis. Symptoms include congestion, cough and sore throat. Onset of symptoms was 2 weeks ago, gradually worsening since that time. He also c/o nasal congestion, productive cough with  yellow colored sputum and sinus pressure for the past 2 weeks .  He is drinking plenty of fluids. Evaluation to date: none. Treatment to date: cough suppressants.  Ill contacts at home or school or work discussed.      Vitals:    06/29/18 1553   BP: 98/60   BP Location: Left arm   Patient Position: Sitting   Pulse: 83   Temp: 98.1 °F (36.7 °C)   SpO2: 97%   Weight: 84.1 kg (185 lb 8 oz)   Height: 175.3 cm (69\")     Gen: mildly ill appearing, alert  Ears: Tm's bulging without redness  Nose:  Congestion  Throat:  Red without exudate, some drainage, tonsils okay  Neck: no LAD  Lung: , good air movement, regular RR  Heart: RR without murmur  Skin: no rash.      Assessment/Plan   Jimi was seen today for nasal congestion and cough.    Diagnoses and all orders for this visit:    Acute non-recurrent maxillary sinusitis  -     amoxicillin (AMOXIL) 500 MG tablet; Take 1 tablet by mouth 3 (Three) Times a Day for 7 days.             There are no Patient Instructions on file for this visit.    Tylenol or Advil as needed for pain, fever, muscle aches  Plenty of fluids  Hand washing discussed  Off work or school note given if needed.  Warm tea for throat.  Pros and cons of antibiotic use discussed    Dr. Neal Jalloh MD  [place], [place]  [facility]  "

## 2018-12-31 ENCOUNTER — OFFICE VISIT (OUTPATIENT)
Dept: RETAIL CLINIC | Facility: CLINIC | Age: 63
End: 2018-12-31

## 2018-12-31 VITALS
RESPIRATION RATE: 18 BRPM | HEART RATE: 70 BPM | OXYGEN SATURATION: 96 % | DIASTOLIC BLOOD PRESSURE: 70 MMHG | TEMPERATURE: 98.7 F | SYSTOLIC BLOOD PRESSURE: 112 MMHG

## 2018-12-31 DIAGNOSIS — J01.40 ACUTE NON-RECURRENT PANSINUSITIS: Primary | ICD-10-CM

## 2018-12-31 PROCEDURE — 99213 OFFICE O/P EST LOW 20 MIN: CPT | Performed by: NURSE PRACTITIONER

## 2018-12-31 RX ORDER — METHYLPREDNISOLONE 4 MG/1
TABLET ORAL
Qty: 1 EACH | Refills: 0 | Status: SHIPPED | OUTPATIENT
Start: 2018-12-31 | End: 2019-02-14

## 2018-12-31 RX ORDER — GUAIFENESIN 600 MG/1
1200 TABLET, EXTENDED RELEASE ORAL 2 TIMES DAILY
Start: 2018-12-31 | End: 2019-02-14

## 2018-12-31 NOTE — PROGRESS NOTES
"Subjective   Jimi De La Vega is a 63 y.o. male.     Patient works as . Reports being in very nahum environment working recently. Has seasonal allergies that tend to flare in fall.       Sinusitis   This is a new problem. Episode onset: 5 days ago. The problem has been waxing and waning since onset. There has been no fever. The pain is moderate. Associated symptoms include congestion, coughing (mild, nonproductive), ear pain (\"pressure and popping\") and sinus pressure. Pertinent negatives include no chills, diaphoresis, headaches, hoarse voice, neck pain, shortness of breath, sneezing, sore throat or swollen glands. Treatments tried: emergen-c. The treatment provided no relief.       The following portions of the patient's history were reviewed and updated as appropriate: allergies, current medications, past medical history, past social history, past surgical history and problem list.    Review of Systems   Constitutional: Positive for fatigue. Negative for appetite change, chills, diaphoresis and fever.   HENT: Positive for congestion, ear pain (\"pressure and popping\"), postnasal drip and sinus pressure. Negative for ear discharge, facial swelling, hearing loss, hoarse voice, mouth sores, nosebleeds, rhinorrhea, sneezing, sore throat, trouble swallowing and voice change.    Eyes: Positive for itching. Negative for pain, discharge, redness and visual disturbance.   Respiratory: Positive for cough (mild, nonproductive). Negative for chest tightness, shortness of breath, wheezing and stridor.    Cardiovascular: Negative for chest pain and palpitations.   Gastrointestinal: Positive for diarrhea (x1 occurance today, 3 occurances 4 days ago, brown/watery). Negative for abdominal pain, blood in stool, constipation, nausea and vomiting.   Musculoskeletal: Negative for myalgias, neck pain and neck stiffness.   Allergic/Immunologic: Positive for environmental allergies. Negative for immunocompromised state. "   Neurological: Negative for dizziness and headaches.       Objective   Physical Exam   Constitutional: He is oriented to person, place, and time. He appears well-developed and well-nourished. He is cooperative.  Non-toxic appearance. He does not appear ill. No distress.   HENT:   Right Ear: Hearing, tympanic membrane, external ear and ear canal normal.   Left Ear: Hearing, tympanic membrane, external ear and ear canal normal.   Nose: Mucosal edema and rhinorrhea present. Right sinus exhibits maxillary sinus tenderness and frontal sinus tenderness. Left sinus exhibits maxillary sinus tenderness and frontal sinus tenderness.   Mouth/Throat: Uvula is midline and mucous membranes are normal. No oral lesions. Oropharyngeal exudate (small amount of purulent post nasal drainage) and posterior oropharyngeal erythema (cobblestoning ) present. No posterior oropharyngeal edema. Tonsils are 2+ on the right. Tonsils are 2+ on the left. No tonsillar exudate.   Eyes: Conjunctivae and lids are normal.   Cardiovascular: Normal rate, regular rhythm, S1 normal and S2 normal.   Pulmonary/Chest: Effort normal and breath sounds normal.   Lymphadenopathy:     He has no cervical adenopathy.   Neurological: He is alert and oriented to person, place, and time.   Skin: Skin is warm and dry. He is not diaphoretic.   Vitals reviewed.      Assessment/Plan   Jimi was seen today for sinusitis.    Diagnoses and all orders for this visit:    Acute non-recurrent pansinusitis  -     MethylPREDNISolone (MEDROL, HA,) 4 MG tablet; Take as directed on package instructions.  -     guaiFENesin (MUCINEX) 600 MG 12 hr tablet; Take 2 tablets by mouth 2 (Two) Times a Day.          -     Follow up with PCP for persistent symptoms or UC/ER for worsening symptoms

## 2018-12-31 NOTE — PATIENT INSTRUCTIONS

## 2019-02-14 ENCOUNTER — OFFICE VISIT (OUTPATIENT)
Dept: FAMILY MEDICINE CLINIC | Facility: CLINIC | Age: 64
End: 2019-02-14

## 2019-02-14 VITALS
SYSTOLIC BLOOD PRESSURE: 110 MMHG | RESPIRATION RATE: 16 BRPM | WEIGHT: 185 LBS | DIASTOLIC BLOOD PRESSURE: 72 MMHG | TEMPERATURE: 98.2 F | BODY MASS INDEX: 27.4 KG/M2 | HEART RATE: 80 BPM | OXYGEN SATURATION: 99 % | HEIGHT: 69 IN

## 2019-02-14 DIAGNOSIS — S99.922A FOOT INJURY, LEFT, INITIAL ENCOUNTER: Primary | ICD-10-CM

## 2019-02-14 DIAGNOSIS — M79.5 FOREIGN BODY (FB) IN SOFT TISSUE: ICD-10-CM

## 2019-02-14 PROCEDURE — 90715 TDAP VACCINE 7 YRS/> IM: CPT | Performed by: PHYSICIAN ASSISTANT

## 2019-02-14 PROCEDURE — 90471 IMMUNIZATION ADMIN: CPT | Performed by: PHYSICIAN ASSISTANT

## 2019-02-14 PROCEDURE — 99213 OFFICE O/P EST LOW 20 MIN: CPT | Performed by: PHYSICIAN ASSISTANT

## 2019-02-14 NOTE — PROGRESS NOTES
"Subjective   Jimi De La Vega is a 63 y.o. male.       Chief Complaint   Patient presents with   • Stepped on a screw on his left foot       History of Present Illness     Alexis is a 63 year old male who presents with screw getting embedded into his left planar foot this morning around 6:30 am this morning.  States that she stepped on an old door with a coated screw sticking up. The screw went through his hiking boot and into his left foot.  He removed the screw with taking his boot off of his foot.Last tetanus shot was great then 10 years. States that he cleaned the cut and applied neosporin with Band-Aid.  Denied any fevers,chills,redness or discharge from the area.  Appetite and sleep has been normal.      The following portions of the patient's history were reviewed and updated as appropriate: allergies, current medications, past family history, past medical history, past social history and past surgical history.    Review of Systems   Constitutional: Negative.  Negative for chills, fatigue and fever.   HENT: Negative.    Eyes: Negative.    Respiratory: Negative.  Negative for cough, shortness of breath and wheezing.    Cardiovascular: Negative.  Negative for chest pain, palpitations and leg swelling.   Gastrointestinal: Negative.    Endocrine: Negative.    Genitourinary: Negative.    Musculoskeletal: Negative.    Skin: Positive for wound.   Allergic/Immunologic: Negative.    Neurological: Negative.    Hematological: Negative.    Psychiatric/Behavioral: Negative.        Social History     Tobacco Use   • Smoking status: Never Smoker   • Smokeless tobacco: Never Used   Substance Use Topics   • Alcohol use: Yes     Alcohol/week: 8.4 oz     Types: 1 Glasses of wine, 1 Cans of beer per week     Comment: I have 1 or 2 a day   • Drug use: No       Vitals:    02/14/19 1126   BP: 110/72   Pulse: 80   Resp: 16   Temp: 98.2 °F (36.8 °C)   TempSrc: Oral   SpO2: 99%   Weight: 83.9 kg (185 lb)   Height: 175.3 cm (69\") " "      Wt Readings from Last 3 Encounters:   02/14/19 83.9 kg (185 lb)   06/29/18 84.1 kg (185 lb 8 oz)   03/09/18 87.8 kg (193 lb 8 oz)       BP Readings from Last 3 Encounters:   02/14/19 110/72   12/31/18 112/70   06/29/18 98/60       No Known Allergies     Body mass index is 27.32 kg/m².  Objective   Physical Exam   Constitutional: He is oriented to person, place, and time. Vital signs are normal. He appears well-developed and well-nourished.        Neurological: He is alert and oriented to person, place, and time.   Skin: Skin is warm, dry and intact. Capillary refill takes less than 2 seconds.   Psychiatric: He has a normal mood and affect. His speech is normal and behavior is normal. Judgment and thought content normal. Cognition and memory are normal.       Assessment/Plan   Jimi was seen today for stepped on a screw on his left foot.    Diagnoses and all orders for this visit:    Foot injury, left, initial encounter    Foreign body (FB) in soft tissue    Other orders  -     Tdap Vaccine Greater Than or Equal To 6yo IM      Mr. Krause \"Alexis\" Ceferino was seen in office today with new on foot injury with screw getting embed into his left plantar foot.  Alexis has given written consent and will received updated Tdap immunization.   The area was cleaned without infection.  He will keep the area cleaned and dry.  He will apply neosporin and band aid.  Alexis will return to office if any redness/discharge,fevers or chills are noted.  He voiced understanding.         "

## 2019-02-22 ENCOUNTER — RESULTS ENCOUNTER (OUTPATIENT)
Dept: FAMILY MEDICINE CLINIC | Facility: CLINIC | Age: 64
End: 2019-02-22

## 2019-02-22 DIAGNOSIS — M05.79 RHEUMATOID ARTHRITIS INVOLVING MULTIPLE SITES WITH POSITIVE RHEUMATOID FACTOR (HCC): ICD-10-CM

## 2019-02-22 DIAGNOSIS — Z12.5 SCREENING FOR PROSTATE CANCER: ICD-10-CM

## 2019-02-22 DIAGNOSIS — Z00.00 ROUTINE ADULT HEALTH MAINTENANCE: ICD-10-CM

## 2019-02-25 LAB
ALBUMIN SERPL-MCNC: 4.6 G/DL (ref 3.5–5.2)
ALBUMIN/GLOB SERPL: 2.3 G/DL
ALP SERPL-CCNC: 61 U/L (ref 40–129)
ALT SERPL-CCNC: 47 U/L (ref 5–41)
APPEARANCE UR: CLEAR
AST SERPL-CCNC: 42 U/L (ref 5–40)
BILIRUB SERPL-MCNC: 0.8 MG/DL (ref 0.2–1.2)
BILIRUB UR QL STRIP: NEGATIVE
BUN SERPL-MCNC: 17 MG/DL (ref 8–23)
BUN/CREAT SERPL: 17.3 (ref 7–25)
CALCIUM SERPL-MCNC: 9.3 MG/DL (ref 8.8–10.5)
CHLORIDE SERPL-SCNC: 99 MMOL/L (ref 98–107)
CHOLEST SERPL-MCNC: 206 MG/DL (ref 0–200)
CO2 SERPL-SCNC: 29.8 MMOL/L (ref 22–29)
COLOR UR: YELLOW
CREAT SERPL-MCNC: 0.98 MG/DL (ref 0.76–1.27)
ERYTHROCYTE [DISTWIDTH] IN BLOOD BY AUTOMATED COUNT: 12.2 % (ref 12.3–15.4)
GLOBULIN SER CALC-MCNC: 2 GM/DL
GLUCOSE SERPL-MCNC: 92 MG/DL (ref 65–99)
GLUCOSE UR QL: NEGATIVE
HCT VFR BLD AUTO: 48 % (ref 37.5–51)
HDLC SERPL-MCNC: 79 MG/DL (ref 40–60)
HGB BLD-MCNC: 15.9 G/DL (ref 13–17.7)
HGB UR QL STRIP: NEGATIVE
KETONES UR QL STRIP: ABNORMAL
LDLC SERPL CALC-MCNC: 106 MG/DL (ref 0–100)
LEUKOCYTE ESTERASE UR QL STRIP: NEGATIVE
MCH RBC QN AUTO: 30.2 PG (ref 26.6–33)
MCHC RBC AUTO-ENTMCNC: 33.1 G/DL (ref 31.5–35.7)
MCV RBC AUTO: 91.3 FL (ref 79–97)
NITRITE UR QL STRIP: NEGATIVE
PH UR STRIP: 6 [PH] (ref 4.5–8)
PLATELET # BLD AUTO: 250 10*3/MM3 (ref 140–450)
POTASSIUM SERPL-SCNC: 4.5 MMOL/L (ref 3.5–5.2)
PROT SERPL-MCNC: 6.6 G/DL (ref 6–8.5)
PROT UR QL STRIP: NEGATIVE
PSA SERPL-MCNC: 0.83 NG/ML (ref 0–4)
RBC # BLD AUTO: 5.26 10*6/MM3 (ref 4.14–5.8)
SODIUM SERPL-SCNC: 138 MMOL/L (ref 136–145)
SP GR UR: 1.01 (ref 1–1.03)
TRIGL SERPL-MCNC: 103 MG/DL (ref 0–150)
TSH SERPL DL<=0.005 MIU/L-ACNC: 1.54 MIU/ML (ref 0.27–4.2)
UROBILINOGEN UR STRIP-MCNC: ABNORMAL MG/DL
VLDLC SERPL CALC-MCNC: 20.6 MG/DL (ref 8–32)
WBC # BLD AUTO: 6.93 10*3/MM3 (ref 3.4–10.8)

## 2019-03-04 ENCOUNTER — OFFICE VISIT (OUTPATIENT)
Dept: FAMILY MEDICINE CLINIC | Facility: CLINIC | Age: 64
End: 2019-03-04

## 2019-03-04 VITALS
SYSTOLIC BLOOD PRESSURE: 112 MMHG | OXYGEN SATURATION: 98 % | HEART RATE: 71 BPM | HEIGHT: 69 IN | WEIGHT: 188 LBS | DIASTOLIC BLOOD PRESSURE: 70 MMHG | BODY MASS INDEX: 27.85 KG/M2

## 2019-03-04 DIAGNOSIS — Z00.00 ROUTINE ADULT HEALTH MAINTENANCE: ICD-10-CM

## 2019-03-04 DIAGNOSIS — Z12.5 SCREENING FOR PROSTATE CANCER: ICD-10-CM

## 2019-03-04 DIAGNOSIS — M05.79 RHEUMATOID ARTHRITIS INVOLVING MULTIPLE SITES WITH POSITIVE RHEUMATOID FACTOR (HCC): Primary | ICD-10-CM

## 2019-03-04 DIAGNOSIS — B35.4 TINEA CORPORIS: ICD-10-CM

## 2019-03-04 PROBLEM — S99.922A FOOT INJURY, LEFT, INITIAL ENCOUNTER: Status: RESOLVED | Noted: 2019-02-14 | Resolved: 2019-03-04

## 2019-03-04 PROBLEM — M75.52 BURSITIS OF LEFT SHOULDER: Status: RESOLVED | Noted: 2018-02-23 | Resolved: 2019-03-04

## 2019-03-04 PROBLEM — M79.5 FOREIGN BODY (FB) IN SOFT TISSUE: Status: RESOLVED | Noted: 2019-02-14 | Resolved: 2019-03-04

## 2019-03-04 PROCEDURE — 99396 PREV VISIT EST AGE 40-64: CPT | Performed by: FAMILY MEDICINE

## 2019-03-04 RX ORDER — KETOCONAZOLE 20 MG/G
CREAM TOPICAL EVERY 12 HOURS SCHEDULED
Qty: 30 G | Refills: 1 | Status: SHIPPED | OUTPATIENT
Start: 2019-03-04 | End: 2020-03-11 | Stop reason: SDUPTHER

## 2019-03-04 NOTE — PROGRESS NOTES
"  Chief Complaint   Patient presents with   • Annual Exam       Subjective   Jimi De La Vega is a 63 y.o. male and is here for a yearly physical exam. The patient reports feeling great, just finished building his house. rheumatoid is totally controlled.    Do you take any herbs or supplements that were not prescribed by a doctor? yes. If so, these will be added to active medication list.    The following portions of the patient's history were reviewed and updated as appropriate: allergies, current medications, past family history, past medical history, past social history, past surgical history and problem list.    Social and Family and Surgical History reviewed and updated today, see Rooming tab.    Health History, Preventive Measures and Vaccination flow sheets reviewed and updated today.    Patient's current medical chart in Epic; including previous office notes, imaging, labs, specialist's evaluation either in notes or in Media tab reviewed today.    Other pertinent medical information also reviewed thru Care Everywhere function is also reviewed today.    Review of Systems  Review of Systems  A comprehensive review of systems was negative.    Vitals:    03/04/19 0952   BP: 112/70   BP Location: Left arm   Patient Position: Sitting   Pulse: 71   SpO2: 98%   Weight: 85.3 kg (188 lb)   Height: 175.3 cm (69\")       General Appearance:  Alert, cooperative, no distress, appears stated age   Head:  Normocephalic, without obvious abnormality, atraumatic   Eyes:  PERRL, conjunctiva/corneas clear, EOM's intact.   Ears:  Normal TM's and external ear canals, both ears   Nose: Nares normal, septum midline, mucosa normal, no drainage or sinus tenderness   Throat: Lips, mucosa, and tongue normal; teeth and gums normal   Neck: Supple, symmetrical, trachea midline, no adenopathy;   thyroid: No enlargement/tenderness/nodules; no carotid  bruit   Back:  Symmetric, no curvature, ROM normal, no CVA tenderness   Lungs:  Clear to " auscultation bilaterally, respirations unlabored   Chest wall:  No tenderness or deformity   Heart:  Regular rate and rhythm, S1 and S2 normal, no murmur, rub or gallop   Abdomen:  Soft, non-tender, bowel sounds active all four quadrants,   no masses, no organomegaly   Rectal:        Extremities: Extremities normal, atraumatic, no cyanosis or edema   Pulses: 2+ and symmetric all extremities   Skin: Ring worm lesion on thigh, vitiligo on arms   Lymph nodes: Cervical, supraclavicular, and axillary nodes normal   Neurologic: CNII-XII intact. Normal strength, sensation and reflexes   throughout          Results for orders placed or performed in visit on 02/22/19   Lipid Panel   Result Value Ref Range    Total Cholesterol 206 (H) 0 - 200 mg/dL    Triglycerides 103 0 - 150 mg/dL    HDL Cholesterol 79 (H) 40 - 60 mg/dL    VLDL Cholesterol 20.6 8 - 32 mg/dL    LDL Cholesterol  106 (H) 0 - 100 mg/dL   CBC (No Diff)   Result Value Ref Range    WBC 6.93 3.40 - 10.80 10*3/mm3    RBC 5.26 4.14 - 5.80 10*6/mm3    Hemoglobin 15.9 13.0 - 17.7 g/dL    Hematocrit 48.0 37.5 - 51.0 %    MCV 91.3 79.0 - 97.0 fL    MCH 30.2 26.6 - 33.0 pg    MCHC 33.1 31.5 - 35.7 g/dL    RDW 12.2 (L) 12.3 - 15.4 %    Platelets 250 140 - 450 10*3/mm3   Comprehensive Metabolic Panel   Result Value Ref Range    Glucose 92 65 - 99 mg/dL    BUN 17 8 - 23 mg/dL    Creatinine 0.98 0.76 - 1.27 mg/dL    eGFR Non African Am 78 >60 mL/min/1.73    eGFR African Am 94 >60 mL/min/1.73    BUN/Creatinine Ratio 17.3 7.0 - 25.0    Sodium 138 136 - 145 mmol/L    Potassium 4.5 3.5 - 5.2 mmol/L    Chloride 99 98 - 107 mmol/L    Total CO2 29.8 (H) 22.0 - 29.0 mmol/L    Calcium 9.3 8.8 - 10.5 mg/dL    Total Protein 6.6 6.0 - 8.5 g/dL    Albumin 4.60 3.50 - 5.20 g/dL    Globulin 2.0 gm/dL    A/G Ratio 2.3 g/dL    Total Bilirubin 0.8 0.2 - 1.2 mg/dL    Alkaline Phosphatase 61 40 - 129 U/L    AST (SGOT) 42 (H) 5 - 40 U/L    ALT (SGPT) 47 (H) 5 - 41 U/L   TSH   Result Value Ref  Range    TSH 1.540 0.270 - 4.200 mIU/mL   Urinalysis With Microscopic If Indicated (No Culture) - Urine, Clean Catch   Result Value Ref Range    Specific Gravity, UA 1.015 1.003 - 1.030    pH, UA 6.0 4.5 - 8.0    Color, UA Yellow     Appearance, UA Clear Clear    Leukocytes, UA Negative Negative    Protein Negative Negative    Glucose, UA Negative Negative    Ketones Trace (A) Negative    Blood, UA Negative Negative    Bilirubin, UA Negative Negative    Urobilinogen, UA Comment     Nitrite, UA Negative Negative   PSA Screen   Result Value Ref Range    PSA 0.831 0.000 - 4.000 ng/mL     Assessment/Plan   Healthy male exam.  Jimi was seen today for annual exam.    Diagnoses and all orders for this visit:    Rheumatoid arthritis involving multiple sites with positive rheumatoid factor (CMS/[facility])    Routine adult health maintenance  -     CBC (No Diff); Future  -     Comprehensive Metabolic Panel; Future  -     Lipid Panel; Future  -     PSA Screen; Future  -     TSH; Future  -     Urinalysis With Microscopic If Indicated (No Culture) - Urine, Clean Catch; Future    Tinea corporis  -     ketoconazole (NIZORAL) 2 % cream; Apply  topically to the appropriate area as directed Every 12 (Twelve) Hours.    Screening for prostate cancer  -     PSA Screen; Future      1. Chol went up, eat admits to fast food eating  Will renew Nizoral cream for right thigh ring worm  Cut back on nightly wine, as liver tests went up  2. Patient Counseling:  --Nutrition: Stressed importance of moderation in sodium/caffeine intake, saturated fat and cholesterol.  Discussed caloric balance, sufficient intake of fresh fruits, vegetables, fiber, calcium, iron.  --Discussed the daily use of baby aspirin, if indicated.not needed  --Exercise: Stressed the importance of regular exercise.   --Substance Abuse: Discussed cessation/primary prevention of tobacco, alcohol, or other drug use; driving or other dangerous activities under the influence.   discussed less wine at night   --Dental health: Discussed importance of regular tooth brushing, flossing, and dental visits.  -- suggested having eyes and vision checked if needed or past due.  --Immunizations reviewed.  --Discussed benefits of screening colonoscopy.UTD  3. Discussed the patient's BMI with him.  The BMI is in the acceptable range  4. Follow up in one year    There are no Patient Instructions on file for this visit.    There are no discontinued medications.     Dr. Neal Jalloh MD  [place], [place]  [facility]

## 2019-03-09 ENCOUNTER — RESULTS ENCOUNTER (OUTPATIENT)
Dept: FAMILY MEDICINE CLINIC | Facility: CLINIC | Age: 64
End: 2019-03-09

## 2019-03-09 DIAGNOSIS — Z12.5 SCREENING FOR PROSTATE CANCER: ICD-10-CM

## 2019-03-09 DIAGNOSIS — Z00.00 ROUTINE ADULT HEALTH MAINTENANCE: ICD-10-CM

## 2020-03-04 ENCOUNTER — RESULTS ENCOUNTER (OUTPATIENT)
Dept: FAMILY MEDICINE CLINIC | Facility: CLINIC | Age: 65
End: 2020-03-04

## 2020-03-04 DIAGNOSIS — Z00.00 ROUTINE ADULT HEALTH MAINTENANCE: ICD-10-CM

## 2020-03-05 LAB
ALBUMIN SERPL-MCNC: 4.4 G/DL (ref 3.5–5.2)
ALBUMIN/GLOB SERPL: 2.2 G/DL
ALP SERPL-CCNC: 52 U/L (ref 39–117)
ALT SERPL-CCNC: 32 U/L (ref 1–41)
APPEARANCE UR: CLEAR
AST SERPL-CCNC: 33 U/L (ref 1–40)
BILIRUB SERPL-MCNC: 1 MG/DL (ref 0.2–1.2)
BILIRUB UR QL STRIP: NEGATIVE
BUN SERPL-MCNC: 16 MG/DL (ref 8–23)
BUN/CREAT SERPL: 14.4 (ref 7–25)
CALCIUM SERPL-MCNC: 9.1 MG/DL (ref 8.6–10.5)
CHLORIDE SERPL-SCNC: 101 MMOL/L (ref 98–107)
CHOLEST SERPL-MCNC: 178 MG/DL (ref 0–200)
CO2 SERPL-SCNC: 25.8 MMOL/L (ref 22–29)
COLOR UR: YELLOW
CREAT SERPL-MCNC: 1.11 MG/DL (ref 0.76–1.27)
ERYTHROCYTE [DISTWIDTH] IN BLOOD BY AUTOMATED COUNT: 12.6 % (ref 12.3–15.4)
GLOBULIN SER CALC-MCNC: 2 GM/DL
GLUCOSE SERPL-MCNC: 89 MG/DL (ref 65–99)
GLUCOSE UR QL: NEGATIVE
HCT VFR BLD AUTO: 45.4 % (ref 37.5–51)
HDLC SERPL-MCNC: 68 MG/DL (ref 40–60)
HGB BLD-MCNC: 15.9 G/DL (ref 13–17.7)
HGB UR QL STRIP: NEGATIVE
KETONES UR QL STRIP: NEGATIVE
LDLC SERPL CALC-MCNC: 90 MG/DL (ref 0–100)
LEUKOCYTE ESTERASE UR QL STRIP: NEGATIVE
MCH RBC QN AUTO: 30.8 PG (ref 26.6–33)
MCHC RBC AUTO-ENTMCNC: 35 G/DL (ref 31.5–35.7)
MCV RBC AUTO: 87.8 FL (ref 79–97)
NITRITE UR QL STRIP: NEGATIVE
PH UR STRIP: 5.5 [PH] (ref 5–8)
PLATELET # BLD AUTO: 234 10*3/MM3 (ref 140–450)
POTASSIUM SERPL-SCNC: 4.1 MMOL/L (ref 3.5–5.2)
PROT SERPL-MCNC: 6.4 G/DL (ref 6–8.5)
PROT UR QL STRIP: NEGATIVE
RBC # BLD AUTO: 5.17 10*6/MM3 (ref 4.14–5.8)
SODIUM SERPL-SCNC: 138 MMOL/L (ref 136–145)
SP GR UR: 1.03 (ref 1–1.03)
TRIGL SERPL-MCNC: 101 MG/DL (ref 0–150)
TSH SERPL DL<=0.005 MIU/L-ACNC: 1.47 UIU/ML (ref 0.27–4.2)
UROBILINOGEN UR STRIP-MCNC: NORMAL MG/DL
VLDLC SERPL CALC-MCNC: 20.2 MG/DL
WBC # BLD AUTO: 7.41 10*3/MM3 (ref 3.4–10.8)

## 2020-03-11 ENCOUNTER — OFFICE VISIT (OUTPATIENT)
Dept: FAMILY MEDICINE CLINIC | Facility: CLINIC | Age: 65
End: 2020-03-11

## 2020-03-11 VITALS
OXYGEN SATURATION: 98 % | SYSTOLIC BLOOD PRESSURE: 118 MMHG | WEIGHT: 194 LBS | HEIGHT: 69 IN | DIASTOLIC BLOOD PRESSURE: 78 MMHG | HEART RATE: 68 BPM | BODY MASS INDEX: 28.73 KG/M2

## 2020-03-11 DIAGNOSIS — Z12.5 SCREENING FOR PROSTATE CANCER: ICD-10-CM

## 2020-03-11 DIAGNOSIS — M05.79 RHEUMATOID ARTHRITIS INVOLVING MULTIPLE SITES WITH POSITIVE RHEUMATOID FACTOR (HCC): ICD-10-CM

## 2020-03-11 DIAGNOSIS — Z00.00 ROUTINE ADULT HEALTH MAINTENANCE: Primary | ICD-10-CM

## 2020-03-11 DIAGNOSIS — B35.4 TINEA CORPORIS: ICD-10-CM

## 2020-03-11 PROBLEM — L80 VITILIGO: Status: ACTIVE | Noted: 2020-03-11

## 2020-03-11 PROCEDURE — 99396 PREV VISIT EST AGE 40-64: CPT | Performed by: FAMILY MEDICINE

## 2020-03-11 RX ORDER — MOMETASONE FUROATE 1 MG/G
CREAM TOPICAL
COMMUNITY
Start: 2019-12-13

## 2020-03-11 RX ORDER — KETOCONAZOLE 20 MG/G
CREAM TOPICAL EVERY 12 HOURS SCHEDULED
Qty: 30 G | Refills: 1 | Status: SHIPPED | OUTPATIENT
Start: 2020-03-11

## 2020-03-11 NOTE — PROGRESS NOTES
"  Chief Complaint   Patient presents with   • Annual Exam       Subjective   Jimi De La Vega is a 64 y.o. male and is here for a yearly physical exam. The patient reports no problems.    Do you take any herbs or supplements that were not prescribed by a doctor? no. If so, these will be added to active medication list.    The following portions of the patient's history were reviewed and updated as appropriate: allergies, current medications, past family history, past medical history, past social history, past surgical history and problem list.    Social and Family and Surgical History reviewed and updated today, see Rooming tab.    Health History, Preventive Measures and Vaccination flow sheets reviewed and updated today.    Patient's current medical chart in Epic; including previous office notes, imaging, labs, specialist's evaluation either in notes or in Media tab reviewed today.    Other pertinent medical information also reviewed thru Care Everywhere function is also reviewed today.    Review of Systems  Review of Systems  A comprehensive review of systems was negative.    Vitals:    03/11/20 1032   BP: 118/78   BP Location: Left arm   Patient Position: Sitting   Cuff Size: Adult   Pulse: 68   SpO2: 98%   Weight: 88 kg (194 lb)   Height: 175.3 cm (69\")       General Appearance:  Alert, cooperative, no distress, appears stated age   Head:  Normocephalic, without obvious abnormality, atraumatic   Eyes:  PERRL, conjunctiva/corneas clear, EOM's intact.   Ears:  Normal TM's and external ear canals, both ears   Nose: Nares normal, septum midline, mucosa normal, no drainage or sinus tenderness   Throat: Lips, mucosa, and tongue normal; teeth and gums normal   Neck: Supple, symmetrical, trachea midline, no adenopathy;   thyroid: No enlargement/tenderness/nodules; no carotid  bruit   Back:  Symmetric, no curvature, ROM normal, no CVA tenderness   Lungs:  Clear to auscultation bilaterally, respirations unlabored   Chest " wall:  No tenderness or deformity   Heart:  Regular rate and rhythm, S1 and S2 normal, no murmur, rub or gallop   Abdomen:  Soft, non-tender, bowel sounds active all four quadrants,   no masses, no organomegaly   Rectal:        Extremities: Extremities normal, atraumatic, no cyanosis or edema   Pulses: 2+ and symmetric all extremities   Skin: Skin color, texture, turgor normal, no rashes or lesions   Lymph nodes: Cervical, supraclavicular, and axillary nodes normal   Neurologic: CNII-XII intact. Normal strength, sensation and reflexes   throughout            Assessment/Plan   Healthy male exam.  Jimi was seen today for annual exam.    Diagnoses and all orders for this visit:    Routine adult health maintenance  -     CBC (No Diff); Future  -     Comprehensive Metabolic Panel; Future  -     Lipid Panel; Future  -     PSA Screen; Future  -     TSH; Future  -     Urinalysis With Microscopic If Indicated (No Culture) - Urine, Clean Catch; Future    Rheumatoid arthritis involving multiple sites with positive rheumatoid factor (CMS/HCC)  -     CBC (No Diff); Future  -     Comprehensive Metabolic Panel; Future    Screening for prostate cancer  -     PSA Screen; Future    Tinea corporis  -     ketoconazole (NIZORAL) 2 % cream; Apply  topically to the appropriate area as directed Every 12 (Twelve) Hours.      1. Labs in good shape  rheumatoid is doing very well.      2. Patient Counseling:  --Nutrition: Stressed importance of moderation in sodium/caffeine intake, saturated fat and cholesterol.  Discussed caloric balance, sufficient intake of fresh fruits, vegetables, fiber, calcium, iron.  --  --Exercise: Stressed the importance of regular exercise. Ran 6 months this am.    --Substance Abuse: Discussed cessation/primary prevention of tobacco, alcohol, or other drug use; driving or other dangerous activities under the influence.    --Dental health: Discussed importance of regular tooth brushing, flossing, and dental  visits.  -- suggested having eyes and vision checked if needed or past due.  --Immunizations reviewed.  --Discussed benefits of screening colonoscopy.  3. Discussed the patient's BMI with him.  The BMI is in the acceptable range  4. Follow up in one year    Patient Instructions     Results for orders placed or performed in visit on 03/04/20   CBC (No Diff)   Result Value Ref Range    WBC 7.41 3.40 - 10.80 10*3/mm3    RBC 5.17 4.14 - 5.80 10*6/mm3    Hemoglobin 15.9 13.0 - 17.7 g/dL    Hematocrit 45.4 37.5 - 51.0 %    MCV 87.8 79.0 - 97.0 fL    MCH 30.8 26.6 - 33.0 pg    MCHC 35.0 31.5 - 35.7 g/dL    RDW 12.6 12.3 - 15.4 %    Platelets 234 140 - 450 10*3/mm3   Comprehensive Metabolic Panel   Result Value Ref Range    Glucose 89 65 - 99 mg/dL    BUN 16 8 - 23 mg/dL    Creatinine 1.11 0.76 - 1.27 mg/dL    eGFR Non African Am 67 >60 mL/min/1.73    eGFR African Am 81 >60 mL/min/1.73    BUN/Creatinine Ratio 14.4 7.0 - 25.0    Sodium 138 136 - 145 mmol/L    Potassium 4.1 3.5 - 5.2 mmol/L    Chloride 101 98 - 107 mmol/L    Total CO2 25.8 22.0 - 29.0 mmol/L    Calcium 9.1 8.6 - 10.5 mg/dL    Total Protein 6.4 6.0 - 8.5 g/dL    Albumin 4.40 3.50 - 5.20 g/dL    Globulin 2.0 gm/dL    A/G Ratio 2.2 g/dL    Total Bilirubin 1.0 0.2 - 1.2 mg/dL    Alkaline Phosphatase 52 39 - 117 U/L    AST (SGOT) 33 1 - 40 U/L    ALT (SGPT) 32 1 - 41 U/L   Lipid Panel   Result Value Ref Range    Total Cholesterol 178 0 - 200 mg/dL    Triglycerides 101 0 - 150 mg/dL    HDL Cholesterol 68 (H) 40 - 60 mg/dL    VLDL Cholesterol 20.2 mg/dL    LDL Cholesterol  90 0 - 100 mg/dL   TSH   Result Value Ref Range    TSH 1.470 0.270 - 4.200 uIU/mL   Urinalysis With Microscopic If Indicated (No Culture) - Urine, Clean Catch   Result Value Ref Range    Specific Gravity, UA 1.026 1.005 - 1.030    pH, UA 5.5 5.0 - 8.0    Color, UA Yellow     Appearance, UA Clear Clear    Leukocytes, UA Negative Negative    Protein Negative Negative    Glucose, UA Negative Negative     Ketones Negative Negative    Blood, UA Negative Negative    Bilirubin, UA Negative Negative    Urobilinogen, UA Comment     Nitrite, UA Negative Negative           Medications Discontinued During This Encounter   Medication Reason   • ketoconazole (NIZORAL) 2 % cream Reorder        Dr. Neal Jalloh MD  [place], [place]  [facility]

## 2020-03-11 NOTE — PATIENT INSTRUCTIONS
Results for orders placed or performed in visit on 03/04/20   CBC (No Diff)   Result Value Ref Range    WBC 7.41 3.40 - 10.80 10*3/mm3    RBC 5.17 4.14 - 5.80 10*6/mm3    Hemoglobin 15.9 13.0 - 17.7 g/dL    Hematocrit 45.4 37.5 - 51.0 %    MCV 87.8 79.0 - 97.0 fL    MCH 30.8 26.6 - 33.0 pg    MCHC 35.0 31.5 - 35.7 g/dL    RDW 12.6 12.3 - 15.4 %    Platelets 234 140 - 450 10*3/mm3   Comprehensive Metabolic Panel   Result Value Ref Range    Glucose 89 65 - 99 mg/dL    BUN 16 8 - 23 mg/dL    Creatinine 1.11 0.76 - 1.27 mg/dL    eGFR Non African Am 67 >60 mL/min/1.73    eGFR African Am 81 >60 mL/min/1.73    BUN/Creatinine Ratio 14.4 7.0 - 25.0    Sodium 138 136 - 145 mmol/L    Potassium 4.1 3.5 - 5.2 mmol/L    Chloride 101 98 - 107 mmol/L    Total CO2 25.8 22.0 - 29.0 mmol/L    Calcium 9.1 8.6 - 10.5 mg/dL    Total Protein 6.4 6.0 - 8.5 g/dL    Albumin 4.40 3.50 - 5.20 g/dL    Globulin 2.0 gm/dL    A/G Ratio 2.2 g/dL    Total Bilirubin 1.0 0.2 - 1.2 mg/dL    Alkaline Phosphatase 52 39 - 117 U/L    AST (SGOT) 33 1 - 40 U/L    ALT (SGPT) 32 1 - 41 U/L   Lipid Panel   Result Value Ref Range    Total Cholesterol 178 0 - 200 mg/dL    Triglycerides 101 0 - 150 mg/dL    HDL Cholesterol 68 (H) 40 - 60 mg/dL    VLDL Cholesterol 20.2 mg/dL    LDL Cholesterol  90 0 - 100 mg/dL   TSH   Result Value Ref Range    TSH 1.470 0.270 - 4.200 uIU/mL   Urinalysis With Microscopic If Indicated (No Culture) - Urine, Clean Catch   Result Value Ref Range    Specific Gravity, UA 1.026 1.005 - 1.030    pH, UA 5.5 5.0 - 8.0    Color, UA Yellow     Appearance, UA Clear Clear    Leukocytes, UA Negative Negative    Protein Negative Negative    Glucose, UA Negative Negative    Ketones Negative Negative    Blood, UA Negative Negative    Bilirubin, UA Negative Negative    Urobilinogen, UA Comment     Nitrite, UA Negative Negative

## 2020-03-20 ENCOUNTER — TELEPHONE (OUTPATIENT)
Dept: GASTROENTEROLOGY | Facility: CLINIC | Age: 65
End: 2020-03-20

## 2020-03-20 NOTE — TELEPHONE ENCOUNTER
FAST TRACK (IN MEDIA) - LAST COLONOSCOPY 09/09/2015 - 5 YEAR RECALL - PERSONAL HISTORY POLYPS - SCHEDULE AT [facility].    SCHEDULE 09/2020.

## 2020-03-22 ENCOUNTER — PREP FOR SURGERY (OUTPATIENT)
Dept: OTHER | Facility: HOSPITAL | Age: 65
End: 2020-03-22

## 2020-03-22 DIAGNOSIS — Z86.010 PERSONAL HISTORY OF COLONIC POLYPS: Primary | ICD-10-CM

## 2020-03-23 NOTE — TELEPHONE ENCOUNTER
CALLED AND SPOKE WITH PATIENT.  EXPLAINED UNABLE TO SCHEDULE HIS PROCEDURE AT THIS TIME.  WILL CALL BACK AT LATER DAY TO SCHEDULE.  HE UNDERSTANDS.

## 2020-06-04 ENCOUNTER — PREP FOR SURGERY (OUTPATIENT)
Dept: OTHER | Facility: HOSPITAL | Age: 65
End: 2020-06-04

## 2020-06-04 DIAGNOSIS — Z86.010 PERSONAL HISTORY OF COLONIC POLYPS: Primary | ICD-10-CM

## 2020-06-05 PROBLEM — Z86.0100 PERSONAL HISTORY OF COLONIC POLYPS: Status: ACTIVE | Noted: 2020-06-05

## 2020-06-05 PROBLEM — Z86.010 PERSONAL HISTORY OF COLONIC POLYPS: Status: ACTIVE | Noted: 2020-06-05

## 2020-09-14 ENCOUNTER — TRANSCRIBE ORDERS (OUTPATIENT)
Dept: ADMINISTRATIVE | Facility: HOSPITAL | Age: 65
End: 2020-09-14

## 2020-09-14 DIAGNOSIS — U07.1 COVID-19: Primary | ICD-10-CM

## 2020-09-18 ENCOUNTER — LAB (OUTPATIENT)
Dept: LAB | Facility: HOSPITAL | Age: 65
End: 2020-09-18

## 2020-09-18 ENCOUNTER — ANESTHESIA EVENT (OUTPATIENT)
Dept: PERIOP | Facility: HOSPITAL | Age: 65
End: 2020-09-18

## 2020-09-18 DIAGNOSIS — U07.1 COVID-19: ICD-10-CM

## 2020-09-18 PROCEDURE — U0004 COV-19 TEST NON-CDC HGH THRU: HCPCS

## 2020-09-18 PROCEDURE — C9803 HOPD COVID-19 SPEC COLLECT: HCPCS

## 2020-09-19 LAB — SARS-COV-2 RNA NOSE QL NAA+PROBE: NOT DETECTED

## 2020-09-21 ENCOUNTER — ANESTHESIA (OUTPATIENT)
Dept: PERIOP | Facility: HOSPITAL | Age: 65
End: 2020-09-21

## 2020-09-21 ENCOUNTER — HOSPITAL ENCOUNTER (OUTPATIENT)
Facility: HOSPITAL | Age: 65
Setting detail: HOSPITAL OUTPATIENT SURGERY
Discharge: HOME | End: 2020-09-21
Attending: INTERNAL MEDICINE | Admitting: INTERNAL MEDICINE

## 2020-09-21 VITALS
WEIGHT: 188.2 LBS | HEART RATE: 56 BPM | OXYGEN SATURATION: 96 % | DIASTOLIC BLOOD PRESSURE: 74 MMHG | BODY MASS INDEX: 27 KG/M2 | TEMPERATURE: 97.7 F | RESPIRATION RATE: 14 BRPM | SYSTOLIC BLOOD PRESSURE: 94 MMHG

## 2020-09-21 DIAGNOSIS — Z86.010 PERSONAL HISTORY OF COLONIC POLYPS: ICD-10-CM

## 2020-09-21 PROCEDURE — 45385 COLONOSCOPY W/LESION REMOVAL: CPT | Performed by: INTERNAL MEDICINE

## 2020-09-21 PROCEDURE — 88305 TISSUE EXAM BY PATHOLOGIST: CPT | Performed by: INTERNAL MEDICINE

## 2020-09-21 PROCEDURE — 45380 COLONOSCOPY AND BIOPSY: CPT | Performed by: INTERNAL MEDICINE

## 2020-09-21 PROCEDURE — 25010000002 PROPOFOL 10 MG/ML EMULSION: Performed by: NURSE ANESTHETIST, CERTIFIED REGISTERED

## 2020-09-21 RX ORDER — SODIUM CHLORIDE 9 MG/ML
40 INJECTION, SOLUTION INTRAVENOUS AS NEEDED
Status: DISCONTINUED | OUTPATIENT
Start: 2020-09-21 | End: 2020-09-21 | Stop reason: HOSPADM

## 2020-09-21 RX ORDER — MAGNESIUM HYDROXIDE 1200 MG/15ML
LIQUID ORAL AS NEEDED
Status: DISCONTINUED | OUTPATIENT
Start: 2020-09-21 | End: 2020-09-21 | Stop reason: HOSPADM

## 2020-09-21 RX ORDER — GLYCOPYRROLATE 0.2 MG/ML
INJECTION INTRAMUSCULAR; INTRAVENOUS AS NEEDED
Status: DISCONTINUED | OUTPATIENT
Start: 2020-09-21 | End: 2020-09-21 | Stop reason: SURG

## 2020-09-21 RX ORDER — SODIUM CHLORIDE 0.9 % (FLUSH) 0.9 %
3 SYRINGE (ML) INJECTION EVERY 12 HOURS SCHEDULED
Status: DISCONTINUED | OUTPATIENT
Start: 2020-09-21 | End: 2020-09-21 | Stop reason: HOSPADM

## 2020-09-21 RX ORDER — SODIUM CHLORIDE 0.9 % (FLUSH) 0.9 %
1-10 SYRINGE (ML) INJECTION AS NEEDED
Status: DISCONTINUED | OUTPATIENT
Start: 2020-09-21 | End: 2020-09-21 | Stop reason: HOSPADM

## 2020-09-21 RX ORDER — MEPERIDINE HYDROCHLORIDE 25 MG/ML
12.5 INJECTION INTRAMUSCULAR; INTRAVENOUS; SUBCUTANEOUS
Status: DISCONTINUED | OUTPATIENT
Start: 2020-09-21 | End: 2020-09-21 | Stop reason: HOSPADM

## 2020-09-21 RX ORDER — SODIUM CHLORIDE 0.9 % (FLUSH) 0.9 %
10 SYRINGE (ML) INJECTION EVERY 12 HOURS SCHEDULED
Status: DISCONTINUED | OUTPATIENT
Start: 2020-09-21 | End: 2020-09-21 | Stop reason: HOSPADM

## 2020-09-21 RX ORDER — DEXMEDETOMIDINE HYDROCHLORIDE 100 UG/ML
INJECTION, SOLUTION INTRAVENOUS AS NEEDED
Status: DISCONTINUED | OUTPATIENT
Start: 2020-09-21 | End: 2020-09-21 | Stop reason: SURG

## 2020-09-21 RX ORDER — LIDOCAINE HYDROCHLORIDE 20 MG/ML
INJECTION, SOLUTION INFILTRATION; PERINEURAL AS NEEDED
Status: DISCONTINUED | OUTPATIENT
Start: 2020-09-21 | End: 2020-09-21 | Stop reason: SURG

## 2020-09-21 RX ORDER — MIDAZOLAM HYDROCHLORIDE 2 MG/2ML
1 INJECTION, SOLUTION INTRAMUSCULAR; INTRAVENOUS
Status: DISCONTINUED | OUTPATIENT
Start: 2020-09-21 | End: 2020-09-21 | Stop reason: HOSPADM

## 2020-09-21 RX ORDER — SODIUM CHLORIDE 0.9 % (FLUSH) 0.9 %
10 SYRINGE (ML) INJECTION AS NEEDED
Status: DISCONTINUED | OUTPATIENT
Start: 2020-09-21 | End: 2020-09-21 | Stop reason: HOSPADM

## 2020-09-21 RX ORDER — ONDANSETRON 2 MG/ML
4 INJECTION INTRAMUSCULAR; INTRAVENOUS ONCE AS NEEDED
Status: DISCONTINUED | OUTPATIENT
Start: 2020-09-21 | End: 2020-09-21 | Stop reason: HOSPADM

## 2020-09-21 RX ORDER — SODIUM CHLORIDE, SODIUM LACTATE, POTASSIUM CHLORIDE, CALCIUM CHLORIDE 600; 310; 30; 20 MG/100ML; MG/100ML; MG/100ML; MG/100ML
100 INJECTION, SOLUTION INTRAVENOUS CONTINUOUS
Status: DISCONTINUED | OUTPATIENT
Start: 2020-09-21 | End: 2020-09-21 | Stop reason: HOSPADM

## 2020-09-21 RX ORDER — SODIUM CHLORIDE, SODIUM LACTATE, POTASSIUM CHLORIDE, CALCIUM CHLORIDE 600; 310; 30; 20 MG/100ML; MG/100ML; MG/100ML; MG/100ML
9 INJECTION, SOLUTION INTRAVENOUS CONTINUOUS
Status: DISCONTINUED | OUTPATIENT
Start: 2020-09-21 | End: 2020-09-21 | Stop reason: HOSPADM

## 2020-09-21 RX ORDER — PROPOFOL 10 MG/ML
VIAL (ML) INTRAVENOUS AS NEEDED
Status: DISCONTINUED | OUTPATIENT
Start: 2020-09-21 | End: 2020-09-21 | Stop reason: SURG

## 2020-09-21 RX ORDER — LIDOCAINE HYDROCHLORIDE 10 MG/ML
0.5 INJECTION, SOLUTION EPIDURAL; INFILTRATION; INTRACAUDAL; PERINEURAL ONCE AS NEEDED
Status: DISCONTINUED | OUTPATIENT
Start: 2020-09-21 | End: 2020-09-21 | Stop reason: HOSPADM

## 2020-09-21 RX ADMIN — PROPOFOL 80 MG: 10 INJECTION, EMULSION INTRAVENOUS at 08:13

## 2020-09-21 RX ADMIN — SODIUM CHLORIDE, POTASSIUM CHLORIDE, SODIUM LACTATE AND CALCIUM CHLORIDE 9 ML/HR: 600; 310; 30; 20 INJECTION, SOLUTION INTRAVENOUS at 07:28

## 2020-09-21 RX ADMIN — LIDOCAINE HYDROCHLORIDE 100 MG: 20 INJECTION, SOLUTION INFILTRATION; PERINEURAL at 08:13

## 2020-09-21 RX ADMIN — PROPOFOL 40 MG: 10 INJECTION, EMULSION INTRAVENOUS at 08:16

## 2020-09-21 RX ADMIN — PROPOFOL 40 MG: 10 INJECTION, EMULSION INTRAVENOUS at 08:30

## 2020-09-21 RX ADMIN — DEXMEDETOMIDINE HYDROCHLORIDE 20 MCG: 100 INJECTION, SOLUTION, CONCENTRATE INTRAVENOUS at 08:11

## 2020-09-21 RX ADMIN — PROPOFOL 40 MG: 10 INJECTION, EMULSION INTRAVENOUS at 08:22

## 2020-09-21 RX ADMIN — PROPOFOL 40 MG: 10 INJECTION, EMULSION INTRAVENOUS at 08:18

## 2020-09-21 RX ADMIN — GLYCOPYRROLATE 0.2 MG: 0.2 INJECTION INTRAMUSCULAR; INTRAVENOUS at 08:11

## 2020-09-21 RX ADMIN — PROPOFOL 20 MG: 10 INJECTION, EMULSION INTRAVENOUS at 08:34

## 2020-09-21 NOTE — ANESTHESIA POSTPROCEDURE EVALUATION
Patient: Jimi De La Vega    Procedure Summary     Date: 09/21/20 Room / Location: [facility] ENDOSCOPY 1 /  [facility]    Anesthesia Start: 0809 Anesthesia Stop: 0838    Procedure: COLONOSCOPY, polypectomy (N/A ) Diagnosis:       Personal history of colonic polyps      Colon polyp      (Personal history of colonic polyps [Z86.010])    Surgeon: Reginaldo Eugene MD Provider: Kervin Kirkpatrick CRNA    Anesthesia Type: MAC ASA Status: 2          Anesthesia Type: MAC    Vitals  Vitals Value Taken Time   /68 09/21/20 0843   Temp 97.7 °F (36.5 °C) 09/21/20 0843   Pulse 61 09/21/20 0843   Resp 12 09/21/20 0843   SpO2 96 % 09/21/20 0843           Post Anesthesia Care and Evaluation    Patient location during evaluation: PHASE II  Patient participation: complete - patient participated  Level of consciousness: awake  Pain management: adequate  Airway patency: patent  Anesthetic complications: No anesthetic complications  PONV Status: none  Cardiovascular status: acceptable  Respiratory status: acceptable  Hydration status: acceptable

## 2020-09-21 NOTE — H&P
Patient Care Team:  Neal Jalloh MD as PCP - General  Alison, Sonja Doe MD as Consulting Physician (Rheumatology)  Dion Reyna MD as Consulting Physician (Dermatology)  Josh Espinal MD as Consulting Physician (Ophthalmology)  Reginaldo Eugene MD as Consulting Physician (Gastroenterology)    CHIEF COMPLAINT: Personal hx colon polyps    HISTORY OF PRESENT ILLNESS:  Last exam was 2015     Past Medical History:   Diagnosis Date   • Arthritis    • Colon polyp     removed some in my 1st exam     Past Surgical History:   Procedure Laterality Date   • CARPAL TUNNEL RELEASE Right    • COLONOSCOPY     • COLONOSCOPY W/ BIOPSIES      age 51, repeat 54, repeat 60   • HERNIA REPAIR     • SHOULDER ROTATOR CUFF REPAIR Left    • VASECTOMY       Family History   Problem Relation Age of Onset   • Heart disease Mother    • Heart attack Mother    • Alcohol abuse Father    • COPD Father    • Breast cancer Sister    • Drug abuse Brother    • Arthritis Maternal Grandfather      Social History     Tobacco Use   • Smoking status: Never Smoker   • Smokeless tobacco: Never Used   Substance Use Topics   • Alcohol use: Yes     Alcohol/week: 12.0 standard drinks     Types: 1 Glasses of wine, 1 Cans of beer, 10 Standard drinks or equivalent per week     Comment: I have 1 or 2 a day   • Drug use: No     Medications Prior to Admission   Medication Sig Dispense Refill Last Dose   • hydroxychloroquine (PLAQUENIL) 200 MG tablet Take 200 mg by mouth 2 (Two) Times a Day.      • ketoconazole (NIZORAL) 2 % cream Apply  topically to the appropriate area as directed Every 12 (Twelve) Hours. 30 g 1    • mometasone (ELOCON) 0.1 % cream         Allergies:  Patient has no known allergies.    REVIEW OF SYSTEMS:  Please see the above history of present illness for pertinent positives and negatives.  The remainder of the patient's systems have been reviewed and are negative.     Vital Signs  Temp:  [98 °F (36.7 °C)] 98 °F (36.7  °C)  Heart Rate:  [57] 57  Resp:  [15] 15  BP: (119)/(84) 119/84    Flowsheet Rows      First Filed Value   Admission Height  --   Admission Weight  85.4 kg (188 lb 3.2 oz) Documented at 09/21/2020 0715           Physical Exam:  Physical Exam   Constitutional: Patient appears well-developed and well-nourished and in no acute distress   HEENT:   Head: Normocephalic and atraumatic.   Eyes:  Pupils are equal, round, and reactive to light. EOM are intact. Sclerae are anicteric and non-injected.  Mouth and Throat: Patient has moist mucous membranes. Oropharynx is clear of any erythema or exudate.     Neck: Neck supple. No JVD present. No thyromegaly present. No lymphadenopathy present.  Cardiovascular: Regular rate, regular rhythm, S1 normal and S2 normal.  Exam reveals no gallop and no friction rub.  No murmur heard.  Pulmonary/Chest: Lungs are clear to auscultation bilaterally. No respiratory distress. No wheezes. No rhonchi. No rales.   Abdominal: Soft. Bowel sounds are normal. No distension and no mass. There is no hepatosplenomegaly. There is no tenderness.   Musculoskeletal: Normal Muscle tone  Extremities: No edema. Pulses are palpable in all 4 extremities.  Neurological: Patient is alert and oriented to person, place, and time. Cranial nerves II-XII are grossly intact with no focal deficits.  Skin: Skin is warm. No rash noted. Nails show no clubbing.  No cyanosis or erythema.    Debilities/Disabilities Identified: None  Emotional Behavior: Appropriate     Results Review:    I reviewed the patient's new clinical results.  Lab Results (most recent)     None          Imaging Results (Most Recent)     None        reviewed    ECG/EMG Results (most recent)     None        reviewed    Assessment/Plan   Personal hx colon polyps/  colonoscopy      I discussed the patients findings and my recommendations with patient.     Reginaldo Eugene MD  09/21/20  08:06 EDT    Time: 10 min prior to procedure.

## 2020-09-21 NOTE — ANESTHESIA PREPROCEDURE EVALUATION
Anesthesia Evaluation     Patient summary reviewed and Nursing notes reviewed   no history of anesthetic complications:  NPO Solid Status: > 8 hours  NPO Liquid Status: > 8 hours           Airway   Mallampati: II  TM distance: >3 FB  Neck ROM: full  No difficulty expected  Dental - normal exam     Pulmonary - negative pulmonary ROS and normal exam    breath sounds clear to auscultation  Cardiovascular - negative cardio ROS and normal exam  Exercise tolerance: good (4-7 METS)    Rhythm: regular  Rate: normal        Neuro/Psych  (+) numbness (CTS),     GI/Hepatic/Renal/Endo - negative ROS     Musculoskeletal     Abdominal  - normal exam   Substance History   (+) alcohol use,   (-) drug use     OB/GYN          Other   arthritis (RA),                      Anesthesia Plan    ASA 2     MAC       Anesthetic plan, all risks, benefits, and alternatives have been provided, discussed and informed consent has been obtained with: patient.  Use of blood products discussed with patient  Consented to blood products.

## 2020-09-21 NOTE — OP NOTE
COLONOSCOPY  Procedure Report    Patient Name:  Jimi De La Vega  YOB: 1955    Date of Surgery:  9/21/2020     Indications:  Personal history of colonic polyps [Z86.010]      Pre-op Diagnosis:   Personal history of colonic polyps [Z86.010]    Post-Op Diagnosis Codes:     * Personal history of colonic polyps [Z86.010]     * Colon polyp [K63.5]         Procedure/CPT® Codes:      Procedure(s):  COLONOSCOPY, polypectomy    Staff:  Surgeon(s):  Reginaldo Eugene MD         Anesthesia: Monitored Anesthesia Care    Estimated Blood Loss: none    Specimens:   ID Type Source Tests Collected by Time   A (Not marked as sent) : Cecal polyp x 1 Polyp Large Intestine, Cecum TISSUE PATHOLOGY EXAM Reginaldo Eugene MD 9/21/2020 0822   B (Not marked as sent) : Ascending polyp x 1- cold snare Polyp Large Intestine, Right / Ascending Colon TISSUE PATHOLOGY EXAM Reginaldo Eugene MD 9/21/2020 0824   C (Not marked as sent) : Transverse polyp x 3 Polyp Large Intestine, Transverse Colon TISSUE PATHOLOGY EXAM Reginaldo Eugene MD 9/21/2020 0829   D (Not marked as sent) : Rectal polyp x 1 Polyp Large Intestine, Rectum TISSUE PATHOLOGY EXAM Reginaldo Eugene MD 9/21/2020 0833       Implants:    Nothing was implanted during the procedure      Description of Procedure: After having signed informed consent, he was brought to the endoscopy suite and placed in the left lateral decubitus position and given his IV sedation. Rectal exam revealed no external lesions, normal anal tone, and no rectal mass. The scope was introduced into the rectum and advanced under direct visualization with ease through the rectum, sigmoid colon, descending colon, to and around the splenic flexure, reduced and advanced through the transverse colon with some looping, to and around the hepatic flexure. The scope was reduced into the ascending colon and then advanced into the cecum. Within the cecum was a  diminutive 3-4 mm polyp that was biopsied and sent separately as cecal polyp. The remainder of the cecum was flushed and cleared and was identified by the appendiceal orifice and the ileocecal valve. The ileocecal valve was intubated and the terminal ileum was normal appearing. The scope was withdrawn back into the ascending colon and withdrawn slowly examining the colon with a sessile 4 x 7 mm polyp that was removed with cold snare technique and sent separately as ascending colon polyp. The scope was withdrawn through the remainder of the ascending colon, around the hepatic flexure, into the transverse colon. Within the transverse colon there were 3 diminutive, less than 6 mm polyps that were all 3 biopsied and sent together as transverse colon polyps x3. The scope was then withdrawn through the remainder of the transverse, descending and sigmoid colon. No mucosal lesions were noted there, however, at 18 cm from the anus was a round 6 mm polyp that was biopsied, felt to be completely removed, and sent separately as rectal polyp. The scope was retroflexed revealing an intact dentate line and no mucosal lesions. The scope was deretroflexed and withdrawn. The patient tolerated the procedure very well.             Findings: Colon to TI good Prep  Polyps (6) Cold Snare/Biopsy     Complications: None    Recommendations: Results to be called. and Repeat in 3 years      Reginaldo Eugene MD     Date: 9/21/2020  Time: 08:40 EDT

## 2020-09-21 NOTE — BRIEF OP NOTE
COLONOSCOPY  Progress Note    Jimi De La Vega  9/21/2020    Pre-op Diagnosis:   Personal history of colonic polyps [Z86.010]       Post-Op Diagnosis Codes:     * Personal history of colonic polyps [Z86.010]     * Colon polyp [K63.5]    Procedure/CPT® Codes:        Procedure(s):  COLONOSCOPY, polypectomy    Surgeon(s):  Reginaldo Eugene MD    Anesthesia: Monitored Anesthesia Care    Staff:   Circulator: Azra Rogers RN  Scrub Person: Patti Brewster         Estimated Blood Loss: none    Urine Voided: * No values recorded between 9/21/2020  8:08 AM and 9/21/2020  8:35 AM *    Specimens:                Specimens     ID Source Type Tests Collected By Collected At Frozen?      A Large Intestine, Cecum Polyp · TISSUE PATHOLOGY EXAM   Reginaldo Eugene MD 9/21/20 0822      Description: Cecal polyp x 1    This specimen was not marked as sent.    B Large Intestine, Right / Ascending Colon Polyp · TISSUE PATHOLOGY EXAM   Reginaldo Eugene MD 9/21/20 0824      Description: Ascending polyp x 1- cold snare    Comment: Trap #1    This specimen was not marked as sent.    C Large Intestine, Transverse Colon Polyp · TISSUE PATHOLOGY EXAM   Reginaldo Eugene MD 9/21/20 0829      Description: Transverse polyp x 3    This specimen was not marked as sent.    D Large Intestine, Rectum Polyp · TISSUE PATHOLOGY EXAM   Reginaldo Eugene MD 9/21/20 0833      Description: Rectal polyp x 1    This specimen was not marked as sent.                Drains: * No LDAs found *    Findings: Colon to TI good Prep  Polyps (6) Cold Snare/Biopsy    Complications: None          Reginaldo Eugene MD     Date: 9/21/2020  Time: 08:38 EDT

## 2020-09-23 LAB
LAB AP CASE REPORT: NORMAL
PATH REPORT.FINAL DX SPEC: NORMAL
PATH REPORT.GROSS SPEC: NORMAL

## 2021-03-30 ENCOUNTER — TELEPHONE (OUTPATIENT)
Dept: FAMILY MEDICINE CLINIC | Facility: CLINIC | Age: 66
End: 2021-03-30

## 2021-03-30 NOTE — TELEPHONE ENCOUNTER
Caller: Jimi De La Vega    Relationship to patient: Self    Best call back number: [phone]    Chief complaint: BLOODWORK    Type of visit: LAB    Requested date: BEFORE 04/26/21    Additional notes:THE PATIENT STATES THAT HE WOULD LIKE TO BE SCHEDULED FOR A LAB FOR HIS PHYSICAL SCHEDULED 04/26/21

## 2021-04-26 ENCOUNTER — OFFICE VISIT (OUTPATIENT)
Dept: FAMILY MEDICINE CLINIC | Facility: CLINIC | Age: 66
End: 2021-04-26

## 2021-04-26 VITALS
HEIGHT: 70 IN | DIASTOLIC BLOOD PRESSURE: 76 MMHG | HEART RATE: 64 BPM | OXYGEN SATURATION: 98 % | TEMPERATURE: 97.3 F | BODY MASS INDEX: 27.2 KG/M2 | WEIGHT: 190 LBS | SYSTOLIC BLOOD PRESSURE: 120 MMHG

## 2021-04-26 DIAGNOSIS — Z00.00 ROUTINE ADULT HEALTH MAINTENANCE: Primary | ICD-10-CM

## 2021-04-26 DIAGNOSIS — M05.79 RHEUMATOID ARTHRITIS INVOLVING MULTIPLE SITES WITH POSITIVE RHEUMATOID FACTOR (HCC): ICD-10-CM

## 2021-04-26 DIAGNOSIS — Z12.5 SCREENING FOR PROSTATE CANCER: ICD-10-CM

## 2021-04-26 PROBLEM — B35.4 TINEA CORPORIS: Status: RESOLVED | Noted: 2017-02-14 | Resolved: 2021-04-26

## 2021-04-26 PROCEDURE — 99396 PREV VISIT EST AGE 40-64: CPT | Performed by: FAMILY MEDICINE

## 2021-04-26 NOTE — PROGRESS NOTES
"  Chief Complaint   Patient presents with   • Annual Exam       Subjective   Jimi De La Vega is a 65 y.o. male and is here for a yearly physical exam. The patient reports no problems.    Do you take any herbs or supplements that were not prescribed by a doctor? no. If so, these will be added to active medication list.    The following portions of the patient's history were reviewed and updated as appropriate: allergies, current medications, past family history, past medical history, past social history, past surgical history and problem list.    Social and Family and Surgical History reviewed and updated today, see Rooming tab.    Health History, Preventive Measures and Vaccination flow sheets reviewed and updated today.    Patient's current medical chart in Epic; including previous office notes, Mychart messages, recent phone calls, imaging, labs, specialist's evaluation either in notes or in Media tab reviewed today.    Other outside pertinent medical information also reviewed thru Care Everywhere function is also reviewed today.    Review of Systems  Review of Systems  A comprehensive review of systems was negative except for: Integument/breast: positive for skin lesion(s)    Vitals:    04/26/21 0804   BP: 120/76   BP Location: Left arm   Patient Position: Sitting   Cuff Size: Adult   Pulse: 64   Temp: 97.3 °F (36.3 °C)   TempSrc: Temporal   SpO2: 98%   Weight: 86.2 kg (190 lb)   Height: 177.8 cm (70\")       General Appearance:  Alert, cooperative, no distress, appears stated age   Head:  Normocephalic, without obvious abnormality, atraumatic   Eyes:  PERRL, conjunctiva/corneas clear, EOM's intact.   Ears:  Normal TM's and external ear canals, both ears   Nose: Nares normal, septum midline, mucosa normal, no drainage or sinus tenderness   Throat: Lips, mucosa, and tongue normal; teeth and gums normal   Neck: Supple, symmetrical, trachea midline, no adenopathy;   thyroid: No enlargement/tenderness/nodules; no " carotid  bruit   Back:  Symmetric, no curvature, ROM normal, no CVA tenderness   Lungs:  Clear to auscultation bilaterally, respirations unlabored   Chest wall:  No tenderness or deformity   Heart:  Regular rate and rhythm, S1 and S2 normal, no murmur, rub or gallop   Abdomen:  Soft, non-tender, bowel sounds active all four quadrants,   no masses, no organomegaly   Rectal:        Extremities: Extremities normal, atraumatic, no cyanosis or edema   Pulses: 2+ and symmetric all extremities   Skin: Arm vitiligo     Lymph nodes: Cervical, supraclavicular, and axillary nodes normal   Neurologic: CNII-XII intact. Normal strength, sensation and reflexes   throughout            Assessment/Plan   Healthy male exam.  Diagnoses and all orders for this visit:    1. Routine adult health maintenance (Primary)  -     CBC (No Diff); Future  -     Comprehensive Metabolic Panel; Future  -     Lipid Panel; Future  -     PSA Screen; Future  -     TSH; Future  -     Urinalysis With Microscopic If Indicated (No Culture) - Urine, Clean Catch; Future    2. Screening for prostate cancer  -     PSA Screen; Future    3. Rheumatoid arthritis involving multiple sites with positive rheumatoid factor (CMS/HCC)  -     CBC (No Diff); Future  -     Comprehensive Metabolic Panel; Future  -     TSH; Future  -     Urinalysis With Microscopic If Indicated (No Culture) - Urine, Clean Catch; Future      1. Labs all ok  RA is stable  Had both covid vaccines  Had C scope last yr, repeat in 3 yr.    2. Patient Counseling:  --Nutrition: Stressed importance of moderation in sodium/caffeine intake, saturated fat and cholesterol.  Discussed caloric balance, sufficient intake of fresh fruits, vegetables, fiber, calcium, iron.  --Exercise: Stressed the importance of regular exercise.   --Substance Abuse: Discussed cessation/primary prevention of tobacco, alcohol, or other drug use; driving or other dangerous activities under the influence.    --Dental health:  Discussed importance of regular tooth brushing, flossing, and dental visits.  --Suggested having eyes and vision checked if needed or past due.  --Immunizations reviewed and given if needed.  --Discussed benefits of screening colonoscopy and or ColoGuard. Repeat in 2023  3. Discussed the patient's BMI with him.  The BMI is in the acceptable range  4. Follow up in one year    Patient Instructions     Results for orders placed or performed in visit on 03/11/21   CBC (No Diff)    Specimen: Blood   Result Value Ref Range    WBC 7.63 3.40 - 10.80 10*3/mm3    RBC 5.52 4.14 - 5.80 10*6/mm3    Hemoglobin 17.0 13.0 - 17.7 g/dL    Hematocrit 49.5 37.5 - 51.0 %    MCV 89.7 79.0 - 97.0 fL    MCH 30.8 26.6 - 33.0 pg    MCHC 34.3 31.5 - 35.7 g/dL    RDW 12.7 12.3 - 15.4 %    Platelets 268 140 - 450 10*3/mm3   Comprehensive Metabolic Panel    Specimen: Blood   Result Value Ref Range    Glucose 92 65 - 99 mg/dL    BUN 13 8 - 23 mg/dL    Creatinine 0.98 0.76 - 1.27 mg/dL    eGFR Non African Am 77 >60 mL/min/1.73    eGFR African Am 93 >60 mL/min/1.73    BUN/Creatinine Ratio 13.3 7.0 - 25.0    Sodium 141 136 - 145 mmol/L    Potassium 4.4 3.5 - 5.2 mmol/L    Chloride 103 98 - 107 mmol/L    Total CO2 29.9 (H) 22.0 - 29.0 mmol/L    Calcium 9.7 8.6 - 10.5 mg/dL    Total Protein 6.6 6.0 - 8.5 g/dL    Albumin 4.50 3.50 - 5.20 g/dL    Globulin 2.1 gm/dL    A/G Ratio 2.1 g/dL    Total Bilirubin 0.7 0.0 - 1.2 mg/dL    Alkaline Phosphatase 65 39 - 117 U/L    AST (SGOT) 30 1 - 40 U/L    ALT (SGPT) 30 1 - 41 U/L   Lipid Panel    Specimen: Blood   Result Value Ref Range    Total Cholesterol 192 0 - 200 mg/dL    Triglycerides 80 0 - 150 mg/dL    HDL Cholesterol 71 (H) 40 - 60 mg/dL    VLDL Cholesterol Guerrero 15 5 - 40 mg/dL    LDL Chol Calc (NIH) 106 (H) 0 - 100 mg/dL   PSA Screen    Specimen: Blood   Result Value Ref Range    PSA 0.844 0.000 - 4.000 ng/mL   TSH    Specimen: Blood   Result Value Ref Range    TSH 1.330 0.270 - 4.200 uIU/mL   Urinalysis  With Microscopic If Indicated (No Culture) - Urine, Clean Catch    Specimen: Urine, Clean Catch   Result Value Ref Range    Specific Gravity, UA 1.026 1.005 - 1.030    pH, UA 5.5 5.0 - 8.0    Color, UA See below: (A)     Appearance, UA Clear Clear    Leukocytes, UA Negative Negative    Protein See below: (A) Negative    Glucose, UA Negative Negative    Ketones Trace (A) Negative    Blood, UA Negative Negative    Bilirubin, UA Negative Negative    Urobilinogen, UA Comment     Nitrite, UA Negative Negative   Microscopic Examination -   Result Value Ref Range    WBC, UA 0-2 /HPF    RBC, UA 3-5 (A) /HPF    Epithelial Cells (non renal) 0-2 /HPF    Cast Type Comment     Bacteria, UA Comment None Seen /HPF           There are no discontinued medications.     Dr. Neal Jalloh MD  [place], [place]  [facility]

## 2021-04-26 NOTE — PATIENT INSTRUCTIONS
Results for orders placed or performed in visit on 03/11/21   CBC (No Diff)    Specimen: Blood   Result Value Ref Range    WBC 7.63 3.40 - 10.80 10*3/mm3    RBC 5.52 4.14 - 5.80 10*6/mm3    Hemoglobin 17.0 13.0 - 17.7 g/dL    Hematocrit 49.5 37.5 - 51.0 %    MCV 89.7 79.0 - 97.0 fL    MCH 30.8 26.6 - 33.0 pg    MCHC 34.3 31.5 - 35.7 g/dL    RDW 12.7 12.3 - 15.4 %    Platelets 268 140 - 450 10*3/mm3   Comprehensive Metabolic Panel    Specimen: Blood   Result Value Ref Range    Glucose 92 65 - 99 mg/dL    BUN 13 8 - 23 mg/dL    Creatinine 0.98 0.76 - 1.27 mg/dL    eGFR Non African Am 77 >60 mL/min/1.73    eGFR African Am 93 >60 mL/min/1.73    BUN/Creatinine Ratio 13.3 7.0 - 25.0    Sodium 141 136 - 145 mmol/L    Potassium 4.4 3.5 - 5.2 mmol/L    Chloride 103 98 - 107 mmol/L    Total CO2 29.9 (H) 22.0 - 29.0 mmol/L    Calcium 9.7 8.6 - 10.5 mg/dL    Total Protein 6.6 6.0 - 8.5 g/dL    Albumin 4.50 3.50 - 5.20 g/dL    Globulin 2.1 gm/dL    A/G Ratio 2.1 g/dL    Total Bilirubin 0.7 0.0 - 1.2 mg/dL    Alkaline Phosphatase 65 39 - 117 U/L    AST (SGOT) 30 1 - 40 U/L    ALT (SGPT) 30 1 - 41 U/L   Lipid Panel    Specimen: Blood   Result Value Ref Range    Total Cholesterol 192 0 - 200 mg/dL    Triglycerides 80 0 - 150 mg/dL    HDL Cholesterol 71 (H) 40 - 60 mg/dL    VLDL Cholesterol Guerrero 15 5 - 40 mg/dL    LDL Chol Calc (NIH) 106 (H) 0 - 100 mg/dL   PSA Screen    Specimen: Blood   Result Value Ref Range    PSA 0.844 0.000 - 4.000 ng/mL   TSH    Specimen: Blood   Result Value Ref Range    TSH 1.330 0.270 - 4.200 uIU/mL   Urinalysis With Microscopic If Indicated (No Culture) - Urine, Clean Catch    Specimen: Urine, Clean Catch   Result Value Ref Range    Specific Gravity, UA 1.026 1.005 - 1.030    pH, UA 5.5 5.0 - 8.0    Color, UA See below: (A)     Appearance, UA Clear Clear    Leukocytes, UA Negative Negative    Protein See below: (A) Negative    Glucose, UA Negative Negative    Ketones Trace (A) Negative    Blood, UA  Negative Negative    Bilirubin, UA Negative Negative    Urobilinogen, UA Comment     Nitrite, UA Negative Negative   Microscopic Examination -   Result Value Ref Range    WBC, UA 0-2 /HPF    RBC, UA 3-5 (A) /HPF    Epithelial Cells (non renal) 0-2 /HPF    Cast Type Comment     Bacteria, UA Comment None Seen /HPF

## 2022-04-29 ENCOUNTER — OFFICE VISIT (OUTPATIENT)
Dept: FAMILY MEDICINE CLINIC | Facility: CLINIC | Age: 67
End: 2022-04-29

## 2022-04-29 VITALS
HEART RATE: 79 BPM | OXYGEN SATURATION: 98 % | WEIGHT: 193 LBS | HEIGHT: 70 IN | TEMPERATURE: 97.7 F | BODY MASS INDEX: 27.63 KG/M2 | SYSTOLIC BLOOD PRESSURE: 120 MMHG | DIASTOLIC BLOOD PRESSURE: 74 MMHG

## 2022-04-29 DIAGNOSIS — Z12.5 SCREENING FOR PROSTATE CANCER: ICD-10-CM

## 2022-04-29 DIAGNOSIS — Z00.00 WELCOME TO MEDICARE PREVENTIVE VISIT: Primary | ICD-10-CM

## 2022-04-29 PROCEDURE — 1159F MED LIST DOCD IN RCRD: CPT | Performed by: FAMILY MEDICINE

## 2022-04-29 PROCEDURE — 1170F FXNL STATUS ASSESSED: CPT | Performed by: FAMILY MEDICINE

## 2022-04-29 PROCEDURE — G0402 INITIAL PREVENTIVE EXAM: HCPCS | Performed by: FAMILY MEDICINE

## 2022-04-29 PROCEDURE — G0403 EKG FOR INITIAL PREVENT EXAM: HCPCS | Performed by: FAMILY MEDICINE

## 2022-04-29 NOTE — PROGRESS NOTES
The ABCs of the Annual Wellness Visit  [place] to Medicare Visit    Chief Complaint   Patient presents with   • Welcome To Medicare     Subjective {   History of Present Illness:  Jimi De La Vega is a 66 y.o. male who presents for a  Welcome to Medicare Visit.    The following portions of the patient's history were reviewed and   updated as appropriate: allergies, current medications, past family history, past medical history, past social history, past surgical history and problem list.     Compared to one year ago, the patient feels his physical   health is better.    Compared to one year ago, the patient feels his mental   health is better.    Recent Hospitalizations:  He was not admitted to the hospital during the last year.       Current Medical Providers:  Patient Care Team:  Neal Jalloh MD as PCP - General  Reyna, Dion Butterfield MD as Consulting Physician (Dermatology)  Josh Espinal MD as Consulting Physician (Ophthalmology)  Reginaldo Eugene MD as Consulting Physician (Gastroenterology)  Azra Dia MD as Consulting Physician (Rheumatology)    Outpatient Medications Prior to Visit   Medication Sig Dispense Refill   • hydroxychloroquine (PLAQUENIL) 200 MG tablet Take 200 mg by mouth 2 (Two) Times a Day.     • ketoconazole (NIZORAL) 2 % cream Apply  topically to the appropriate area as directed Every 12 (Twelve) Hours. 30 g 1   • mometasone (ELOCON) 0.1 % cream        No facility-administered medications prior to visit.       No opioid medication identified on active medication list. I have reviewed chart for other potential  high risk medication/s and harmful drug interactions in the elderly.          Aspirin is not on active medication list.  Aspirin use is not indicated based on review of current medical condition/s. Risk of harm outweighs potential benefits.  .    Patient Active Problem List   Diagnosis   • Carpal tunnel syndrome of right wrist   • Rheumatoid arthritis involving  "multiple sites with positive rheumatoid factor (HCC)   • Welcome to Medicare preventive visit   • Screening for prostate cancer   • Screening for colon cancer   • Vitiligo   • Personal history of colonic polyps     Advance Care Planning  Advance Directive is not on file.  ACP discussion was held with the patient during this visit. Patient has an advance directive (not in EMR), copy requested.          Objective      Vitals:    04/29/22 1336   BP: 120/74   BP Location: Left arm   Patient Position: Sitting   Cuff Size: Adult   Pulse: 79   Temp: 97.7 °F (36.5 °C)   SpO2: 98%   Weight: 87.5 kg (193 lb)   Height: 177.8 cm (70\")     BMI Readings from Last 1 Encounters:   04/29/22 27.69 kg/m²   BMI is above normal parameters. Recommendations include: exercise counseling and nutrition counseling    Does the patient have evidence of cognitive impairment? No    Physical Exam  Vitals reviewed.   Cardiovascular:      Rate and Rhythm: Normal rate.   Pulmonary:      Effort: Pulmonary effort is normal.   Neurological:      Mental Status: He is alert.   Psychiatric:         Mood and Affect: Mood normal.                ECG 12 Lead    Date/Time: 4/29/2022 2:08 PM  Performed by: Neal Jalloh MD  Authorized by: Neal Jalloh MD   Comparison: not compared with previous ECG     Clinical impression: normal ECG               HEALTH RISK ASSESSMENT    Smoking Status:  Social History     Tobacco Use   Smoking Status Never Smoker   Smokeless Tobacco Never Used     Alcohol Consumption:  Social History     Substance and Sexual Activity   Alcohol Use Yes   • Alcohol/week: 12.0 standard drinks   • Types: 1 Glasses of wine, 1 Cans of beer, 10 Standard drinks or equivalent per week    Comment: I have 1 or 2 a day       Fall Risk Screen:    ROSALEE Fall Risk Assessment was completed, and patient is at LOW risk for falls.Assessment completed on:4/29/2022    Depression Screen:   PHQ-2/PHQ-9 Depression Screening 4/29/2022   Retired PHQ-9 " Total Score -   Retired Total Score -   Little Interest or Pleasure in Doing Things 0-->not at all   Feeling Down, Depressed or Hopeless 0-->not at all   PHQ-9: Brief Depression Severity Measure Score 0       Health Habits and Functional and Cognitive Screening:  Functional & Cognitive Status 4/29/2022   Do you have difficulty preparing food and eating? No   Do you have difficulty bathing yourself, getting dressed or grooming yourself? No   Do you have difficulty using the toilet? No   Do you have difficulty moving around from place to place? No   Do you have trouble with steps or getting out of a bed or a chair? No   Current Diet Well Balanced Diet   Dental Exam Up to date   Eye Exam Up to date   Exercise (times per week) 3 times per week   Current Exercises Include Home Exercise Program (TV, Computer, Etc.)   Do you need help using the phone?  No   Are you deaf or do you have serious difficulty hearing?  No   Do you need help with transportation? No   Do you need help shopping? No   Do you need help preparing meals?  No   Do you need help with housework?  No   Do you need help with laundry? No   Do you need help taking your medications? No   Do you need help managing money? No   Do you ever drive or ride in a car without wearing a seat belt? No   Have you felt unusual stress, anger or loneliness in the last month? No   Who do you live with? Spouse   If you need help, do you have trouble finding someone available to you? No   Have you been bothered in the last four weeks by sexual problems? No   Do you have difficulty concentrating, remembering or making decisions? No       Visual Acuity:    No exam data present    Age-appropriate Screening Schedule:  Refer to the list below for future screening recommendations based on patient's age, sex and/or medical conditions. Orders for these recommended tests are listed in the plan section. The patient has been provided with a written plan.    Health Maintenance   Topic Date  Due   • INFLUENZA VACCINE  08/01/2022   • TDAP/TD VACCINES (3 - Td or Tdap) 02/14/2029   • ZOSTER VACCINE  Discontinued          Assessment/Plan   CMS Preventative Services Quick Reference  Risk Factors Identified During Encounter  None Identified  The above risks/problems have been discussed with the patient.  Pertinent information has been shared with the patient in the After Visit Summary.  Follow up plans and orders are seen below in the Assessment/Plan Section.    Diagnoses and all orders for this visit:    1. Welcome to Medicare preventive visit (Primary)    2. Screening for prostate cancer    Other orders  -     ECG 12 Lead    doing very well  Labs next week  RA is silent  Running the [place] mini-marathon tomorrow.  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     An After Visit Summary and PPPS were made available to the patient.

## 2022-05-11 ENCOUNTER — OFFICE VISIT (OUTPATIENT)
Dept: FAMILY MEDICINE CLINIC | Facility: CLINIC | Age: 67
End: 2022-05-11

## 2022-05-11 VITALS
OXYGEN SATURATION: 98 % | HEIGHT: 70 IN | WEIGHT: 196 LBS | BODY MASS INDEX: 28.06 KG/M2 | HEART RATE: 73 BPM | DIASTOLIC BLOOD PRESSURE: 64 MMHG | TEMPERATURE: 97.7 F | SYSTOLIC BLOOD PRESSURE: 120 MMHG

## 2022-05-11 DIAGNOSIS — M25.461 EFFUSION OF RIGHT KNEE JOINT: Primary | ICD-10-CM

## 2022-05-11 PROCEDURE — 99213 OFFICE O/P EST LOW 20 MIN: CPT | Performed by: FAMILY MEDICINE

## 2022-05-11 NOTE — PROGRESS NOTES
"  Subjective   Jimi De La Vega is a 66 y.o. male who is here for   Chief Complaint   Patient presents with   • Knee Pain     Right knee swelling/ lower calf swelling too -started after mini marathon 10 days ago    .     History of Present Illness     Alexis ran and walk to the mini marathon 2 weekends ago.  No particular injury during the day of the marathon.  Next day his right knee was swollen.  Has not had a knee injury before.    The following portions of the patient's history were reviewed and updated as appropriate: allergies, current medications, past family history, past medical history, past social history, past surgical history and problem list.    Review of Systems    Objective   Vitals:    05/11/22 1402   BP: 120/64   BP Location: Left arm   Patient Position: Sitting   Cuff Size: Adult   Pulse: 73   Temp: 97.7 °F (36.5 °C)   SpO2: 98%   Weight: 88.9 kg (196 lb)   Height: 177.8 cm (70\")      Physical Exam  Musculoskeletal:      Left knee: Swelling, effusion and bony tenderness present. No erythema or ecchymosis.         Assessment & Plan   Diagnoses and all orders for this visit:    1. Effusion of right knee joint (Primary)    Likely a bruised medial meniscus of the right knee, or possible bone bruise from recent mini marathon.  Continue his Tylenol and Advil  Continue wearing his knee brace and a lower leg compression sock.  Ice packs as needed  Will probably take another 2 to 4 weeks to heal    There are no Patient Instructions on file for this visit.    There are no discontinued medications.     No follow-ups on file.    Dr. Neal Jalloh  [facility]  [place], [place]    "

## 2022-07-05 ENCOUNTER — OFFICE VISIT (OUTPATIENT)
Dept: FAMILY MEDICINE CLINIC | Facility: CLINIC | Age: 67
End: 2022-07-05

## 2022-07-05 VITALS
BODY MASS INDEX: 27.92 KG/M2 | HEART RATE: 64 BPM | WEIGHT: 195 LBS | TEMPERATURE: 97.3 F | SYSTOLIC BLOOD PRESSURE: 120 MMHG | DIASTOLIC BLOOD PRESSURE: 80 MMHG | OXYGEN SATURATION: 96 % | HEIGHT: 70 IN

## 2022-07-05 DIAGNOSIS — M25.561 ACUTE PAIN OF RIGHT KNEE: Primary | ICD-10-CM

## 2022-07-05 PROCEDURE — 99213 OFFICE O/P EST LOW 20 MIN: CPT | Performed by: FAMILY MEDICINE

## 2022-07-05 PROCEDURE — 20610 DRAIN/INJ JOINT/BURSA W/O US: CPT | Performed by: FAMILY MEDICINE

## 2022-07-05 RX ORDER — MELOXICAM 15 MG/1
15 TABLET ORAL DAILY
Qty: 90 TABLET | Refills: 1 | Status: SHIPPED | OUTPATIENT
Start: 2022-07-05 | End: 2022-12-01

## 2022-07-05 RX ORDER — LIDOCAINE HYDROCHLORIDE 10 MG/ML
1 INJECTION, SOLUTION INFILTRATION; PERINEURAL ONCE
Status: COMPLETED | OUTPATIENT
Start: 2022-07-05 | End: 2022-07-05

## 2022-07-05 RX ORDER — TRIAMCINOLONE ACETONIDE 40 MG/ML
40 INJECTION, SUSPENSION INTRA-ARTICULAR; INTRAMUSCULAR ONCE
Status: COMPLETED | OUTPATIENT
Start: 2022-07-05 | End: 2022-07-05

## 2022-07-05 RX ADMIN — LIDOCAINE HYDROCHLORIDE 1 ML: 10 INJECTION, SOLUTION INFILTRATION; PERINEURAL at 10:54

## 2022-07-05 RX ADMIN — TRIAMCINOLONE ACETONIDE 40 MG: 40 INJECTION, SUSPENSION INTRA-ARTICULAR; INTRAMUSCULAR at 10:54

## 2022-07-05 NOTE — PROGRESS NOTES
"  Subjective   Jimi De La Vega is a 67 y.o. male who is here for   Chief Complaint   Patient presents with   • Knee Pain     Right knee// not getting better    .   Answers for HPI/ROS submitted by the patient on 7/1/2022  What is the primary reason for your visit?: Lower Extremity Injury  Incident occurred: more than 1 week ago  Incident location: in the street  Injury mechanism: unknown  Pain location: right heel  Pain - numeric: 5/10  Pain course: constant  loss of motion: Yes  Foreign body present: no foreign bodies      Knee Pain   The incident occurred more than 1 week ago. The pain is present in the right knee. The quality of the pain is described as aching. The pain is at a severity of 5/10. The pain is moderate. The pain has been fluctuating since onset. Pertinent negatives include no inability to bear weight, loss of motion, loss of sensation, muscle weakness, numbness or tingling. He reports no foreign bodies present. The symptoms are aggravated by movement, palpation and weight bearing. He has tried rest and NSAIDs for the symptoms. The treatment provided mild relief.   Lower Extremity Issue  Pertinent negatives include no numbness. The symptoms are aggravated by movement.      Right medial knee still hurts after running the mini marathon  Pointing to  right over the MCL of the right knee.  Mobic helping.    The following portions of the patient's history were reviewed and updated as appropriate: allergies, current medications, past medical history, past social history, past surgical history and problem list.    Review of Systems   Neurological: Negative for tingling and numbness.       Objective   Vitals:    07/05/22 0938   BP: 120/80   BP Location: Left arm   Patient Position: Sitting   Cuff Size: Adult   Pulse: 64   Temp: 97.3 °F (36.3 °C)   SpO2: 96%   Weight: 88.5 kg (195 lb)   Height: 177.8 cm (70\")        Physical Exam  Musculoskeletal:      Right knee: Swelling present. Tenderness present over " the [facility].       Arthrocentesis    Date/Time: 7/5/2022 9:48 AM  Performed by: Neal Jalloh MD  Authorized by: Neal Jalloh MD   Consent: Verbal consent obtained.  Risks and benefits: risks, benefits and alternatives were discussed  Consent given by: patient  Patient understanding: patient states understanding of the procedure being performed  Patient identity confirmed: verbally with patient  Indications: joint swelling and pain   Body area: knee  Joint: right knee  Local anesthesia used: yes    Anesthesia:  Local anesthesia used: yes  Local Anesthetic: lidocaine 1% without epinephrine and topical anesthetic  Anesthetic total: 1 mL    Sedation:  Patient sedated: no    Preparation: Patient was prepped and draped in the usual sterile fashion.  Needle size: 22 G  Ultrasound guidance: no  Approach: lateral  Aspirate amount: 0 mL  Patient tolerance: patient tolerated the procedure well with no immediate complications        Assessment & Plan   Diagnoses and all orders for this visit:    1. Acute pain of right knee (Primary)  -     meloxicam (Mobic) 15 MG tablet; Take 1 tablet by mouth Daily.  Dispense: 90 tablet; Refill: 1  -     Arthrocentesis  -     lidocaine (XYLOCAINE) 1 % injection 1 mL  -     triamcinolone acetonide (KENALOG-40) injection 40 mg      There are no Patient Instructions on file for this visit.    There are no discontinued medications.     Return if symptoms worsen or fail to improve.    Dr. Neal Jalloh  [place], [place]

## 2022-10-11 ENCOUNTER — OFFICE VISIT (OUTPATIENT)
Dept: FAMILY MEDICINE CLINIC | Facility: CLINIC | Age: 67
End: 2022-10-11

## 2022-10-11 ENCOUNTER — HOSPITAL ENCOUNTER (OUTPATIENT)
Dept: ULTRASOUND IMAGING | Facility: HOSPITAL | Age: 67
Discharge: HOME | End: 2022-10-11
Admitting: NURSE PRACTITIONER

## 2022-10-11 VITALS
WEIGHT: 193 LBS | HEIGHT: 70 IN | HEART RATE: 72 BPM | TEMPERATURE: 97.8 F | SYSTOLIC BLOOD PRESSURE: 110 MMHG | DIASTOLIC BLOOD PRESSURE: 76 MMHG | BODY MASS INDEX: 27.63 KG/M2 | OXYGEN SATURATION: 98 % | RESPIRATION RATE: 16 BRPM

## 2022-10-11 DIAGNOSIS — M79.661 PAIN AND SWELLING OF RIGHT LOWER LEG: Primary | ICD-10-CM

## 2022-10-11 DIAGNOSIS — M79.89 PAIN AND SWELLING OF RIGHT LOWER LEG: Primary | ICD-10-CM

## 2022-10-11 PROCEDURE — 99213 OFFICE O/P EST LOW 20 MIN: CPT | Performed by: NURSE PRACTITIONER

## 2022-10-11 PROCEDURE — 93971 EXTREMITY STUDY: CPT

## 2022-10-11 NOTE — PROGRESS NOTES
Answers for HPI/ROS submitted by the patient on 10/11/2022  What is the primary reason for your visit?: Lower Extremity Injury  Incident occurred: more than 1 week ago  Incident location: in the street  Injury mechanism: unknown  Pain location: right knee  Pain - numeric: 5/10  Pain course: constant  loss of motion: Yes  Foreign body present: no foreign bodies  Aggravated by: weight bearing    Patient ID: Jimi De La Vega is a 67 y.o. male     Patient Care Team:  Neal Jalloh MD as PCP - General  Reyna, Dion Butterfield MD as Consulting Physician (Dermatology)  Josh Espinal MD as Consulting Physician (Ophthalmology)  Reginaldo Eugene MD as Consulting Physician (Gastroenterology)  Azra Dia MD as Consulting Physician (Rheumatology)    Subjective     Chief Complaint   Patient presents with   • Leg Swelling       History of Present Illness    Jimi De La Vega presents to [facility] today for new onset right leg swelling which started one week ago.  No known injury.  No recent travel.  No surgery.  Pain to back of right calf.  With increased swelling to right lower leg and right ankle.  Has history of right knee pain which he feels is improving.  History of rheumatoid arthritis which is in remission on Plaquenil 200 mg bid.   .     He denies any complaints of fever, chills, cough, chest pain, shortness of air, abdominal pain, nausea, or any other concerns.     The following portions of the patient's history were reviewed and updated as appropriate: allergies, current medications, past family history, past medical history, past social history, past surgical history and problem list.       ROS    Vitals:    10/11/22 0954   BP: 110/76   Pulse: 72   Resp: 16   Temp: 97.8 °F (36.6 °C)   SpO2: 98%       Documented weights    10/11/22 0954   Weight: 87.5 kg (193 lb)     Body mass index is 27.69 kg/m².    Results for orders placed or performed in visit on 04/25/22    CBC (No Diff)    Specimen: Blood   Result Value Ref Range    WBC 5.4 3.4 - 10.8 x10E3/uL    RBC 5.14 4.14 - 5.80 x10E6/uL    Hemoglobin 14.3 13.0 - 17.7 g/dL    Hematocrit 43.5 37.5 - 51.0 %    MCV 85 79 - 97 fL    MCH 27.8 26.6 - 33.0 pg    MCHC 32.9 31.5 - 35.7 g/dL    RDW 14.5 11.6 - 15.4 %    Platelets 279 150 - 450 x10E3/uL   Comprehensive Metabolic Panel    Specimen: Blood   Result Value Ref Range    Glucose 90 65 - 99 mg/dL    BUN 13 8 - 27 mg/dL    Creatinine 1.02 0.76 - 1.27 mg/dL    EGFR Result 82 >59 mL/min/1.73    BUN/Creatinine Ratio 13 10 - 24    Sodium 140 134 - 144 mmol/L    Potassium 4.3 3.5 - 5.2 mmol/L    Chloride 102 96 - 106 mmol/L    Total CO2 24 20 - 29 mmol/L    Calcium 9.2 8.6 - 10.2 mg/dL    Total Protein 6.4 6.0 - 8.5 g/dL    Albumin 4.3 3.8 - 4.8 g/dL    Globulin 2.1 1.5 - 4.5 g/dL    A/G Ratio 2.0 1.2 - 2.2    Total Bilirubin 0.7 0.0 - 1.2 mg/dL    Alkaline Phosphatase 67 44 - 121 IU/L    AST (SGOT) 34 0 - 40 IU/L    ALT (SGPT) 24 0 - 44 IU/L   Lipid Panel    Specimen: Blood   Result Value Ref Range    Total Cholesterol 181 100 - 199 mg/dL    Triglycerides 60 0 - 149 mg/dL    HDL Cholesterol 72 >39 mg/dL    VLDL Cholesterol Guerrero 12 5 - 40 mg/dL    LDL Chol Calc (NIH) 97 0 - 99 mg/dL   PSA Screen    Specimen: Blood   Result Value Ref Range    PSA 0.7 0.0 - 4.0 ng/mL   TSH    Specimen: Blood   Result Value Ref Range    TSH 1.390 0.450 - 4.500 uIU/mL   Urinalysis With Microscopic If Indicated (No Culture) - Urine, Clean Catch    Specimen: Urine, Clean Catch   Result Value Ref Range    Specific Gravity, UA 1.021 1.005 - 1.030    pH, UA 5.5 5.0 - 7.5    Color, UA Yellow Yellow    Appearance, UA Clear Clear    Leukocytes, UA Negative Negative    Protein Trace Negative/Trace    Glucose, UA Negative Negative    Ketones Negative Negative    Blood, UA Negative Negative    Bilirubin, UA Negative Negative    Urobilinogen, UA 0.2 0.2 - 1.0 mg/dL    Nitrite, UA Negative Negative    Microscopic  Examination Comment            Objective     Physical Exam  Vitals reviewed.   Cardiovascular:      Rate and Rhythm: Normal rate.   Pulmonary:      Effort: Pulmonary effort is normal.   Musculoskeletal:      Right lower leg: Swelling and tenderness (posterior right lower leg) present. 2+ Edema present.      Left lower leg: Normal. No swelling or tenderness.      Right ankle: Swelling present. No deformity. Normal pulse.      Left ankle: Normal pulse.   Neurological:      Mental Status: He is alert.         Assessment & Plan     Assessment/Plan     Diagnoses and all orders for this visit:    1. Pain and swelling of right lower leg (Primary)  -     US venous doppler lower extremity right (duplex)     Rest and elevate.  Tylenol as needed.       US results will determine further recommendations.      In the meantime, instructed to contact us sooner for any problems or concerns.    Follow Up:  Return if symptoms worsen or fail to improve.    Patient was given instructions and counseling regarding condition or for health maintenance advice.  Please see specific information pulled into the AVS if appropriate.      Patient was wearing facemask when I entered the room and throughout our encounter. Protective equipment was worn throughout this patient encounter including a face mask.  Hand hygiene was performed before donning protective equipment and after removal when leaving the room.     Paula Roberts, APRN  Family Medicine  [facility] [place] [facility]  10/11/22  10:29 EDT

## 2022-10-11 NOTE — PROGRESS NOTES
"Answers for HPI/ROS submitted by the patient on 10/11/2022  What is the primary reason for your visit?: Lower Extremity Injury  Incident occurred: more than 1 week ago  Incident location: in the street  Injury mechanism: unknown  Pain location: right knee  Pain - numeric: 5/10  Pain course: constant  loss of motion: Yes  Foreign body present: no foreign bodies  Aggravated by: weight bearing    Subjective      Chief Complaint   Patient presents with   • Knee Pain     Right       Jimi De La Vega is a 67 y.o. male who presents {knee rfv:31373} involving {knee; r/l/both:19542}. Onset was {onset:19671}. Inciting event: {inciting event:13248}. Current symptoms include: {symptoms:93507}. Pain is aggravated by {knee pain aggravating factors:86700}. Patient {has had:26799} prior knee problems. Evaluation to date: {knee pain eval:00038}. Treatment to date: {knee pain tx:95653}.  Seen Dr. Jalloh on 7/5/2022 and had cortisone injection in knee.      {Common ambulatory SmartLinks:01355}     Review of Systems  {ros; complete:76515}    Objective   /76 (BP Location: Left arm, Patient Position: Sitting, Cuff Size: Adult)   Pulse 72   Temp 97.8 °F (36.6 °C)   Resp 16   Ht 177.8 cm (70\")   Wt 87.5 kg (193 lb)   SpO2 98%   BMI 27.69 kg/m²   Right knee: {exam; knee:06522::\"normal\",\"no effusion, full active range of motion, no joint line tenderness, ligamentous structures intact.\"}   Left knee:  {exam; knee:66036::\"normal\",\"no effusion, full active range of motion, no joint line tenderness, ligamentous structures intact.\"}     X-ray {knee; r/l/bothL:19510}: {normal:5769::\"no fracture, dislocation, swelling or degenerative changes noted\"}     Assessment & Plan {Knee pain dx list:25656}     {knee pain tx list:63333}    Patient was wearing face mask when I entered the room and throughout our encounter. Protective equipment was worn throughout this patient encounter including a face mask.  Hand hygiene was performed before " donning protective equipment and after removal when leaving the room.     Paula Roberts, APRN

## 2022-10-12 ENCOUNTER — TELEPHONE (OUTPATIENT)
Dept: FAMILY MEDICINE CLINIC | Facility: CLINIC | Age: 67
End: 2022-10-12

## 2022-10-12 DIAGNOSIS — M05.79 RHEUMATOID ARTHRITIS INVOLVING MULTIPLE SITES WITH POSITIVE RHEUMATOID FACTOR: ICD-10-CM

## 2022-10-12 DIAGNOSIS — M25.561 CHRONIC PAIN OF RIGHT KNEE: Primary | ICD-10-CM

## 2022-10-12 DIAGNOSIS — G89.29 CHRONIC PAIN OF RIGHT KNEE: Primary | ICD-10-CM

## 2022-10-12 NOTE — TELEPHONE ENCOUNTER
Gave pt results from US ordered by Paula, he said this has been going on since April and he has rested/kept elevated with no improvement. He thinks he needs a referral to see someone to address the issue, please advise.     Okay we can send him on up to orthopedics in [place] for his chronic right knee pain and lower leg swelling.  Working diagnosis is rheumatoid arthritis in the knee and the evidence of a popliteal cyst behind the knee as seen on the leg ultrasound    Neal Jalloh MD

## 2022-10-12 NOTE — TELEPHONE ENCOUNTER
Caller: Jimi De La Vega    Relationship: Self    Best call back number: [phone]    Caller requesting test results: PATIENT    What test was performed: ULTRASOUND OF LEG     When was the test performed: 10/11/22    Where was the test performed: [facility]    Additional notes: PATIENT CALLED AND REQUESTED RESULTS OF LEG ULTRASOUND

## 2022-10-13 ENCOUNTER — TELEPHONE (OUTPATIENT)
Dept: FAMILY MEDICINE CLINIC | Facility: CLINIC | Age: 67
End: 2022-10-13

## 2022-10-13 NOTE — TELEPHONE ENCOUNTER
Caller: Jimi De La Vega    Relationship: Self    Best call back number: [phone]    What is the best time to reach you: ANY    Who are you requesting to speak with (clinical staff, provider,  specific staff member): CLINICAL    Do you know the name of the person who called:  CLINICAL  What was the call regarding: THE INFORMATION THAT WAS GIVEN YESTERDAY  STAFF DID NOT MAKE IT CLEAR WHAT THEY FOUND ON THE DOPPLER ULTRASOUND AND PATIENT NEEDS TO KNOW IF ITS A CYST OR NOT AND WHY [facility] IS NOT AWARE OF THE CYST ON THE BACK OF THE KNEE    Do you require a callback: YES

## 2022-10-13 NOTE — TELEPHONE ENCOUNTER
I see a note from the scheduling team about needing a new dx due to the cyst that is seen on the ultra sound, does  need to change this?

## 2022-10-14 NOTE — TELEPHONE ENCOUNTER
PATIENT CALLED BACK AND IS REQUESTING A CALL BACK.  DISCUSSED THE 48 HOUR RULE.  HE WILL CALL BACK IF HE HAS NOT HEARD ANYTHING TODAY.  PLEASE GIVE HIM A CALL

## 2022-10-14 NOTE — TELEPHONE ENCOUNTER
Does the dx code need to reflect the cyst on his knee? Scheduling sent the referral back for review because when they spoke w/ pt he mentioned the cyst as well.

## 2022-10-14 NOTE — TELEPHONE ENCOUNTER
I spoke w/ pt and advised him I have sent a message to  to look at the dx code for the cyst since this is why the referral is not going forward.

## 2022-10-17 NOTE — TELEPHONE ENCOUNTER
Spoke with Rebeca escalera/ referrals she will send the  a note to look at referral to help get patient scheduled.

## 2022-10-17 NOTE — TELEPHONE ENCOUNTER
Scheduling is not going forward with the referral until the dx code is fixed to reflect the cyst I believe.

## 2022-10-19 ENCOUNTER — OFFICE VISIT (OUTPATIENT)
Dept: ORTHOPEDIC SURGERY | Facility: CLINIC | Age: 67
End: 2022-10-19

## 2022-10-19 VITALS
SYSTOLIC BLOOD PRESSURE: 146 MMHG | DIASTOLIC BLOOD PRESSURE: 74 MMHG | BODY MASS INDEX: 27.2 KG/M2 | HEART RATE: 91 BPM | HEIGHT: 70 IN | WEIGHT: 190 LBS

## 2022-10-19 DIAGNOSIS — M17.11 PRIMARY OSTEOARTHRITIS OF RIGHT KNEE: Primary | ICD-10-CM

## 2022-10-19 DIAGNOSIS — R52 PAIN: ICD-10-CM

## 2022-10-19 DIAGNOSIS — M25.461 EFFUSION OF RIGHT KNEE: ICD-10-CM

## 2022-10-19 PROCEDURE — 99214 OFFICE O/P EST MOD 30 MIN: CPT | Performed by: NURSE PRACTITIONER

## 2022-10-19 PROCEDURE — 20610 DRAIN/INJ JOINT/BURSA W/O US: CPT | Performed by: NURSE PRACTITIONER

## 2022-10-19 PROCEDURE — 73562 X-RAY EXAM OF KNEE 3: CPT | Performed by: NURSE PRACTITIONER

## 2022-10-19 RX ADMIN — TRIAMCINOLONE ACETONIDE 80 MG: 40 INJECTION, SUSPENSION INTRA-ARTICULAR; INTRAMUSCULAR at 13:27

## 2022-10-19 RX ADMIN — LIDOCAINE HYDROCHLORIDE 8 ML: 10 INJECTION, SOLUTION EPIDURAL; INFILTRATION; INTRACAUDAL; PERINEURAL at 13:27

## 2022-10-19 NOTE — PROGRESS NOTES
Subjective:     Patient ID: Jimi De La Vega is a 67 y.o. male.    Chief Complaint:    History of Present Illness  Jimi De La Vega       Social History     Occupational History   • Occupation:      Employer: [employer]   Tobacco Use   • Smoking status: Never   • Smokeless tobacco: Never   • Tobacco comments:     None   Vaping Use   • Vaping Use: Never used   Substance and Sexual Activity   • Alcohol use: Yes     Alcohol/week: 2.0 standard drinks     Types: 1 Glasses of wine, 1 Cans of beer per week     Comment: I have 1 or 2 a day   • Drug use: No   • Sexual activity: Yes     Partners: Female     Birth control/protection: Post-menopausal     Comment: None      Past Medical History:   Diagnosis Date   • Ankle sprain 1976    [place]   • Arthritis    • Colon polyp     removed some in my 1st exam   • CTS (carpal tunnel syndrome) 2013 ?    None   • Fracture, finger 1975    None   • Rotator cuff syndrome 2008    Surgery 13 or so years ago     Past Surgical History:   Procedure Laterality Date   • CARPAL TUNNEL RELEASE Right    • COLONOSCOPY     • COLONOSCOPY N/A 09/21/2020    Procedure: COLONOSCOPY, polypectomy;  Surgeon: Reginaldo Eugene MD;  Location: [facility];  Service: Gastroenterology;  Laterality: N/A;  Cecal polyp x 1; Ascending polyp x 1- cold snare; Transverse p;olyp x 3; Rectal polyp x 1   • COLONOSCOPY W/ BIOPSIES      age 51, repeat 54, repeat 60   • HERNIA REPAIR     • SHOULDER ROTATOR CUFF REPAIR Left    • VASECTOMY         Family History   Problem Relation Age of Onset   • Heart disease Mother    • Heart attack Mother    • Alcohol abuse Father    • COPD Father    • Breast cancer Sister    • Drug abuse Brother    • Arthritis Maternal Grandfather    • Rheumatologic disease Maternal Grandfather         None   • Rheumatologic disease Maternal Aunt         None               Objective:  Physical Exam    Vital signs reviewed.   General: No acute distress.  Eyes:  "conjunctiva clear; pupils equally round and reactive  ENT: external ears and nose atraumatic; oropharynx clear  CV: no peripheral edema  Resp: normal respiratory effort  Skin: no rashes or wounds; normal turgor  Psych: mood and affect appropriate; recent and remote memory intact    Vitals:    10/19/22 1305   Weight: 86.2 kg (190 lb)   Height: 177.8 cm (70\")         10/19/22  1305   Weight: 86.2 kg (190 lb)     Body mass index is 27.26 kg/m².      Ortho Exam         Imaging:    Assessment:      No diagnosis found.       Plan:  1.   Orders:  No orders of the defined types were placed in this encounter.    No orders of the defined types were placed in this encounter.          I ordered and reviewed the DYAN today.       Transcribed from ambient dictation for MELANY Lau by Amy Kemp MA.  10/19/22   13:06 EDT    {SIMON Provider Statement:05136::\"Patient or patient representative verbalized consent to the visit recording.\",\"I have personally performed the services described in this document as transcribed by the above individual, and it is both accurate and complete.\"}    "

## 2022-10-19 NOTE — PROGRESS NOTES
Subjective:     Patient ID: Jimi De La Vega is a 67 y.o. male.    Chief Complaint:  Right knee pain, new patient to examiner    History of Present Illness  Jimi De La Vega 67-year-old male who presents to clinic for evaluation of his right knee.  He read a half marathon at the end of April 2022 began experiencing some mild medial discomfort along with some swelling.  Began running around age 54 and completes many races during the year.  He was provided with an injection with primary care which did provide him with mild symptom improvement at 1 May.  Approximately 2 weeks ago has noted worsening of symptoms including acute onset of swelling which she had not previously experienced.  He is not experiencing significant pain rate discomfort a 3 out of 10 described as aching in nature but more concerned with the amount of swelling.  Stiffness with flexion.  He is not completed any recent races and denies any acute injury.  Pain with standing, seated, working, driving, walking, running and ascending and descending stairs.  Denies any recent injections since May.  Denies of the knee is locking, catching or giving way.  Denies other concerns present this time.    Social History     Occupational History   • Occupation:      Employer: [employer]   Tobacco Use   • Smoking status: Never   • Smokeless tobacco: Never   • Tobacco comments:     None   Vaping Use   • Vaping Use: Never used   Substance and Sexual Activity   • Alcohol use: Yes     Alcohol/week: 2.0 standard drinks     Types: 1 Glasses of wine, 1 Cans of beer per week     Comment: I have 1 or 2 a day   • Drug use: No   • Sexual activity: Yes     Partners: Female     Birth control/protection: Post-menopausal     Comment: None      Past Medical History:   Diagnosis Date   • Ankle sprain 1976    [place]   • Arthritis    • Colon polyp     removed some in my 1st exam   • CTS (carpal tunnel syndrome) 2013 ?    None   • Fracture, finger 1975  "   None   • Rotator cuff syndrome 2008    Surgery 13 or so years ago     Past Surgical History:   Procedure Laterality Date   • CARPAL TUNNEL RELEASE Right    • COLONOSCOPY     • COLONOSCOPY N/A 09/21/2020    Procedure: COLONOSCOPY, polypectomy;  Surgeon: Reginaldo Eugene MD;  Location: [facility];  Service: Gastroenterology;  Laterality: N/A;  Cecal polyp x 1; Ascending polyp x 1- cold snare; Transverse p;olyp x 3; Rectal polyp x 1   • COLONOSCOPY W/ BIOPSIES      age 51, repeat 54, repeat 60   • HERNIA REPAIR     • SHOULDER ROTATOR CUFF REPAIR Left    • VASECTOMY         Family History   Problem Relation Age of Onset   • Heart disease Mother    • Heart attack Mother    • Alcohol abuse Father    • COPD Father    • Breast cancer Sister    • Drug abuse Brother    • Arthritis Maternal Grandfather    • Rheumatologic disease Maternal Grandfather         None   • Rheumatologic disease Maternal Aunt         None             Objective:  Physical Exam    Vital signs reviewed.   General: No acute distress.  Eyes: conjunctiva clear; pupils equally round and reactive  ENT: external ears and nose atraumatic; oropharynx clear  CV: no peripheral edema  Resp: normal respiratory effort  Skin: no rashes or wounds; normal turgor  Psych: mood and affect appropriate; recent and remote memory intact    Vitals:    10/19/22 1305   BP: 146/74   Pulse: 91   Weight: 86.2 kg (190 lb)   Height: 177.8 cm (70\")         10/19/22  1305   Weight: 86.2 kg (190 lb)     Body mass index is 27.26 kg/m².      Right Knee Exam     Tenderness   The patient is experiencing tenderness in the patella.    Range of Motion   Right knee extension: 0 passive.   Right knee flexion: 125 passive.     Tests   Sandoval:  Medial - negative Lateral - negative  Varus: negative Valgus: negative  Lachman:  Anterior - 1+    Posterior - negative  Drawer:  Anterior - negative    Posterior - negative    Other   Erythema: absent  Sensation: normal  Pulse: " present  Swelling: moderate  Effusion: effusion present    Comments:  Positive crepitus arc of motion                 Imaging:  Right Knee X-Ray  Indication: Pain    AP, Lateral, and [place] views    Findings:  No fracture  No bony lesion  Normal soft tissues  Mild medial compartment narrowing, moderate patellofemoral narrowing with reactive osteophytes patellofemoral joint, positive joint effusion    No prior studies were available for comparison.    Assessment:        1. Primary osteoarthritis of right knee    2. Pain    3. Effusion of right knee           Plan:  Large Joint Arthrocentesis: R knee  Date/Time: 10/19/2022 1:27 PM  Consent given by: patient  Site marked: site marked  Timeout: Immediately prior to procedure a time out was called to verify the correct patient, procedure, equipment, support staff and site/side marked as required   Supporting Documentation  Indications: pain   Procedure Details  Location: knee - R knee  Preparation: Patient was prepped and draped in the usual sterile fashion  Needle size: 18 G  Approach: superior (lateral)  Medications administered: 80 mg triamcinolone acetonide 40 MG/ML; 8 mL lidocaine PF 1% 1 %  Aspirate amount: 28 mL  Aspirate: serous and yellow  Patient tolerance: patient tolerated the procedure well with no immediate complications          1. Discussed treatment options with patient.  Discussed aspiration corticosteroid injection which she does wish to proceed with at this time.  We will plan to see him back in clinic in 4 weeks to reevaluate.  Discussed application of ice at injection site this evening.  Plan to see him back in clinic before that time with any questions concerns.  All questions answered.  Orders:  Orders Placed This Encounter   Procedures   • Large Joint Arthrocentesis: R knee   • XR Knee 3+ View With [place] Right     No orders of the defined types were placed in this encounter.          I ordered and reviewed the DYAN today.     Dragon  Dictation utilized.

## 2022-10-20 RX ORDER — TRIAMCINOLONE ACETONIDE 40 MG/ML
80 INJECTION, SUSPENSION INTRA-ARTICULAR; INTRAMUSCULAR
Status: COMPLETED | OUTPATIENT
Start: 2022-10-19 | End: 2022-10-19

## 2022-10-20 RX ORDER — LIDOCAINE HYDROCHLORIDE 10 MG/ML
8 INJECTION, SOLUTION EPIDURAL; INFILTRATION; INTRACAUDAL; PERINEURAL
Status: COMPLETED | OUTPATIENT
Start: 2022-10-19 | End: 2022-10-19

## 2022-11-08 ENCOUNTER — OFFICE VISIT (OUTPATIENT)
Dept: FAMILY MEDICINE CLINIC | Facility: CLINIC | Age: 67
End: 2022-11-08

## 2022-11-08 VITALS
HEART RATE: 80 BPM | DIASTOLIC BLOOD PRESSURE: 80 MMHG | WEIGHT: 196 LBS | OXYGEN SATURATION: 96 % | HEIGHT: 70 IN | SYSTOLIC BLOOD PRESSURE: 130 MMHG | TEMPERATURE: 97.1 F | BODY MASS INDEX: 28.06 KG/M2

## 2022-11-08 DIAGNOSIS — S60.454A FOREIGN BODY IN SKIN OF RIGHT RING FINGER: Primary | ICD-10-CM

## 2022-11-08 PROCEDURE — 99213 OFFICE O/P EST LOW 20 MIN: CPT | Performed by: FAMILY MEDICINE

## 2022-11-08 NOTE — PROGRESS NOTES
"  Subjective   Jimi De La Vega is a 67 y.o. male who is here for   Chief Complaint   Patient presents with   • Hand Pain     Right hand ring finger- thinks has a fish fin/tooth in finger. 3 months ago went fishing    .     History of Present Illness     Uyen had a tender right ring finger for 3 months.  He was fishing, and was reeling in a large [place] fish and the finger was cut either by the fish itself or the hook.  Its been off and on red swollen and tender for months now he occasionally squeezes it gets blood and something black out of it but still not better    The following portions of the patient's history were reviewed and updated as appropriate: allergies, current medications, past family history, past medical history, past social history, past surgical history and problem list.    Review of Systems    Objective   Vitals:    11/08/22 1621   BP: 130/80   BP Location: Left arm   Patient Position: Sitting   Cuff Size: Adult   Pulse: 80   Temp: 97.1 °F (36.2 °C)   SpO2: 96%   Weight: 88.9 kg (196 lb)   Height: 177.8 cm (70\")      Physical Exam   Right ring finger on the palmar side distal finger it is swollen red and tender.  I agree most likely has a retained foreign body    Assessment & Plan   Diagnoses and all orders for this visit:    1. Foreign body in skin of right ring finger (Primary)  -     Ambulatory Referral to Hand Surgery    Continue warm salt water soaks until he sees the hand surgeon for exploratory surgery for removal of likely foreign body    There are no Patient Instructions on file for this visit.    There are no discontinued medications.     No follow-ups on file.    Dr. Neal Jalloh  [facility] [place], [place]    "

## 2022-11-16 ENCOUNTER — OFFICE VISIT (OUTPATIENT)
Dept: ORTHOPEDIC SURGERY | Facility: CLINIC | Age: 67
End: 2022-11-16

## 2022-11-16 VITALS — HEIGHT: 70 IN | WEIGHT: 196 LBS | BODY MASS INDEX: 28.06 KG/M2

## 2022-11-16 DIAGNOSIS — M25.461 EFFUSION OF RIGHT KNEE: Primary | ICD-10-CM

## 2022-11-16 DIAGNOSIS — M17.11 PRIMARY OSTEOARTHRITIS OF RIGHT KNEE: ICD-10-CM

## 2022-11-16 PROCEDURE — 99214 OFFICE O/P EST MOD 30 MIN: CPT | Performed by: NURSE PRACTITIONER

## 2022-11-16 RX ORDER — IBUPROFEN 400 MG/1
400 TABLET ORAL EVERY 6 HOURS PRN
COMMUNITY

## 2022-11-16 RX ORDER — PREDNISONE 10 MG/1
TABLET ORAL
Qty: 39 TABLET | Refills: 0 | Status: SHIPPED | OUTPATIENT
Start: 2022-11-16 | End: 2022-12-01

## 2022-11-16 NOTE — PROGRESS NOTES
Subjective:     Patient ID: Jimi De La Vega is a 67 y.o. male.    Chief Complaint:  Follow-up right knee, mechanical knee pain  Aspiration and corticosteroid injection 10/19/2022  History of Present Illness  Jimi De La Vega returns to clinic for follow-up of his right knee.  Aspiration corticosteroid injection completed last visit presents today with increased pain and swelling at the knee.  He did note symptom relief initially but symptoms have returned.  Maximal tenderness continues to be present along the anterior aspect of the knee.  Is continued with active range of motion and activity as tolerated.  Initially presented to clinic in October 2022 with pain off and on since April 2022 when he began running a half marathon at the end of April began experiencing some discomfort medial aspect of the knee with some associated swelling.  He has rested, I decreased his activity without any significant symptom relief.  Denies any prior MRI, denies any prior CT.  Denies any other concerns present.       Social History     Occupational History   • Occupation:      Employer: [employer]   Tobacco Use   • Smoking status: Never   • Smokeless tobacco: Never   • Tobacco comments:     None   Vaping Use   • Vaping Use: Never used   Substance and Sexual Activity   • Alcohol use: Yes     Alcohol/week: 2.0 standard drinks     Types: 1 Glasses of wine, 1 Cans of beer per week     Comment: I have 1 or 2 a day   • Drug use: No   • Sexual activity: Yes     Partners: Female     Birth control/protection: Post-menopausal     Comment: None      Past Medical History:   Diagnosis Date   • Ankle sprain 1976    [place]   • Arthritis    • Colon polyp     removed some in my 1st exam   • CTS (carpal tunnel syndrome) 2013 ?    None   • Fracture, finger 1975    None   • Rotator cuff syndrome 2008    Surgery 13 or so years ago     Past Surgical History:   Procedure Laterality Date   • CARPAL TUNNEL RELEASE Right    •  "COLONOSCOPY     • COLONOSCOPY N/A 09/21/2020    Procedure: COLONOSCOPY, polypectomy;  Surgeon: Reginaldo Eugene MD;  Location: [facility];  Service: Gastroenterology;  Laterality: N/A;  Cecal polyp x 1; Ascending polyp x 1- cold snare; Transverse p;olyp x 3; Rectal polyp x 1   • COLONOSCOPY W/ BIOPSIES      age 51, repeat 54, repeat 60   • HERNIA REPAIR     • SHOULDER ROTATOR CUFF REPAIR Left    • VASECTOMY         Family History   Problem Relation Age of Onset   • Heart disease Mother    • Heart attack Mother    • Alcohol abuse Father    • COPD Father    • Breast cancer Sister    • Drug abuse Brother    • Arthritis Maternal Grandfather    • Rheumatologic disease Maternal Grandfather         None   • Rheumatologic disease Maternal Aunt         None               Objective:  Physical Exam  General: No acute distress.  Eyes: conjunctiva clear; pupils equally round and reactive  ENT: external ears and nose atraumatic; oropharynx clear  CV: no peripheral edema  Resp: normal respiratory effort  Skin: no rashes or wounds; normal turgor  Psych: mood and affect appropriate; recent and remote memory intact    Vitals:    11/16/22 0922   Weight: 88.9 kg (196 lb)   Height: 177.8 cm (70\")         11/16/22 0922   Weight: 88.9 kg (196 lb)     Body mass index is 28.12 kg/m².      Ortho Exam      Right Knee Exam      Tenderness   The patient is experiencing tenderness in the patella.     Range of Motion   Right knee extension: 0 passive.   Right knee flexion: 125 passive.      Tests   Sandoval:  Medial - negative Lateral - negative  Varus: negative Valgus: negative  Lachman:  Anterior - 1+    Posterior - negative  Drawer:  Anterior - negative    Posterior - negative     Other   Erythema: absent  Sensation: normal  Pulse: present  Swelling: mild  Effusion: effusion present     Comments:  Positive crepitus arc of motion    Assessment:        1. Effusion of right knee    2. Primary osteoarthritis of right knee       "     Plan:  1. Discussed plan of care with patient.  We will proceed MRI right knee.  We will start oral prednisone taper and decrease the swelling.  Plan to see him back in clinic after completion of testing discussed results and further plan of care.  All questions answered.  Orders:  Orders Placed This Encounter   Procedures   • MRI Knee Right Without Contrast     New Medications Ordered This Visit   Medications   • predniSONE (DELTASONE) 10 MG tablet     Si mg daily x 3 days, 40 mg daily x 3 days, 20 mg daily x 3 days, 10 mg daily x 3 days     Dispense:  39 tablet     Refill:  0       Dragon dictation utilized.

## 2022-11-21 ENCOUNTER — HOSPITAL ENCOUNTER (OUTPATIENT)
Dept: MRI IMAGING | Facility: HOSPITAL | Age: 67
Discharge: HOME | End: 2022-11-21
Admitting: NURSE PRACTITIONER

## 2022-11-21 DIAGNOSIS — M25.461 EFFUSION OF RIGHT KNEE: ICD-10-CM

## 2022-11-21 DIAGNOSIS — M17.11 PRIMARY OSTEOARTHRITIS OF RIGHT KNEE: ICD-10-CM

## 2022-11-21 PROCEDURE — 73721 MRI JNT OF LWR EXTRE W/O DYE: CPT

## 2022-12-01 ENCOUNTER — OFFICE VISIT (OUTPATIENT)
Dept: ORTHOPEDIC SURGERY | Facility: CLINIC | Age: 67
End: 2022-12-01

## 2022-12-01 VITALS — WEIGHT: 196 LBS | HEIGHT: 70 IN | BODY MASS INDEX: 28.06 KG/M2

## 2022-12-01 DIAGNOSIS — M25.461 EFFUSION OF RIGHT KNEE: ICD-10-CM

## 2022-12-01 DIAGNOSIS — M17.11 PRIMARY OSTEOARTHRITIS OF RIGHT KNEE: Primary | ICD-10-CM

## 2022-12-01 PROCEDURE — 20610 DRAIN/INJ JOINT/BURSA W/O US: CPT | Performed by: NURSE PRACTITIONER

## 2022-12-01 PROCEDURE — 99214 OFFICE O/P EST MOD 30 MIN: CPT | Performed by: NURSE PRACTITIONER

## 2022-12-01 NOTE — PROGRESS NOTES
Subjective:     Patient ID: Jimi De La Vega is a 67 y.o. male.    Chief Complaint:  Follow-up right knee  DJD right knee  MRI completed   History of Present Illness  Jimi De La Vega returns to clinic for follow-up of his right knee.  Is completed MRI and presents to discuss results and further plan of care.  Does not experience significant pain but has continued to experience swelling and increased discomfort with flexion.  Denies any is locking, catching or giving way.  He did receive corticosteroid injection 2/19/2022 with acute initial improvement but then symptoms returned.  Maximal tenderness present along the anterior aspect of the knee but has continued with activities as tolerated.  Denies any other concerns present.    Social History     Occupational History   • Occupation:      Employer: [employer]   Tobacco Use   • Smoking status: Never   • Smokeless tobacco: Never   • Tobacco comments:     None   Vaping Use   • Vaping Use: Never used   Substance and Sexual Activity   • Alcohol use: Yes     Alcohol/week: 2.0 standard drinks     Types: 1 Glasses of wine, 1 Cans of beer per week     Comment: I have 1 or 2 a day   • Drug use: No   • Sexual activity: Yes     Partners: Female     Birth control/protection: Post-menopausal     Comment: None      Past Medical History:   Diagnosis Date   • Ankle sprain 1976    [place]   • Arthritis    • Colon polyp     removed some in my 1st exam   • CTS (carpal tunnel syndrome) 2013 ?    None   • Fracture, finger 1975    None   • Rotator cuff syndrome 2008    Surgery 13 or so years ago     Past Surgical History:   Procedure Laterality Date   • CARPAL TUNNEL RELEASE Right    • COLONOSCOPY     • COLONOSCOPY N/A 09/21/2020    Procedure: COLONOSCOPY, polypectomy;  Surgeon: Reginaldo Eugene MD;  Location: [facility];  Service: Gastroenterology;  Laterality: N/A;  Cecal polyp x 1; Ascending polyp x 1- cold snare; Transverse p;olyp x 3; Rectal  "polyp x 1   • COLONOSCOPY W/ BIOPSIES      age 51, repeat 54, repeat 60   • HERNIA REPAIR     • SHOULDER ROTATOR CUFF REPAIR Left    • VASECTOMY         Family History   Problem Relation Age of Onset   • Heart disease Mother    • Heart attack Mother    • Alcohol abuse Father    • COPD Father    • Breast cancer Sister    • Drug abuse Brother    • Arthritis Maternal Grandfather    • Rheumatologic disease Maternal Grandfather         None   • Rheumatologic disease Maternal Aunt         None               Objective:  Physical Exam    General: No acute distress.  Eyes: conjunctiva clear; pupils equally round and reactive  ENT: external ears and nose atraumatic; oropharynx clear  CV: no peripheral edema  Resp: normal respiratory effort  Skin: no rashes or wounds; normal turgor  Psych: mood and affect appropriate; recent and remote memory intact    Vitals:    12/01/22 1513   Weight: 88.9 kg (196 lb)   Height: 177.8 cm (70\")         12/01/22  1513   Weight: 88.9 kg (196 lb)     Body mass index is 28.12 kg/m².      Ortho Exam     Right Knee Exam      Tenderness   The patient is experiencing tenderness in the patella.     Range of Motion   Right knee extension: 0 passive.   Right knee flexion: 125 passive.      Tests   Sandoval:  Medial - negative Lateral - negative  Varus: negative Valgus: negative  Lachman:  Anterior - 1+    Posterior - negative  Drawer:  Anterior - negative    Posterior - negative     Other   Erythema: absent  Sensation: normal  Pulse: present  Swelling: mild  Effusion: effusion present     Comments:    Positive crepitus arc of motion    Imaging:    MRI Knee Right Without Contrast    Result Date: 11/22/2022  1. Large radial tear medial meniscal body extends to the outer third of the meniscus. 2. Small subchondral stress fractures of the medial femoral condyle and medial tibial plateau. 3. Tricompartmental arthritis with cartilage loss greatest at the medial compartment. Joint effusion with Baker's cyst.  " This report was finalized on 11/22/2022 8:55 AM by Dr. Andrew Butterfield M.D.      Independently reviewed MRI right knee large radial tear medial meniscus body Horizontal tear anterior horn and body of the lateral meniscus.  Subchondral sclerosis, stress fracture full-thickness cartilage defect along with medial femoral condyle full-thickness cartilage loss medial tibial plateau.  Patellofemoral arthritis with cartilage loss    Assessment:        1. Primary osteoarthritis of right knee    2. Effusion of right knee           Plan:    Large Joint Arthrocentesis: R knee  Date/Time: 12/1/2022 4:19 PM  Consent given by: patient  Site marked: site marked  Timeout: Immediately prior to procedure a time out was called to verify the correct patient, procedure, equipment, support staff and site/side marked as required   Supporting Documentation  Indications: pain   Procedure Details  Location: knee - R knee  Preparation: Patient was prepped and draped in the usual sterile fashion  Needle size: 18 G  Approach: superior  Medications administered: 60 mg Sodium Hyaluronate 60 MG/3ML  Patient tolerance: patient tolerated the procedure well with no immediate complications      Office supplied medication.    1. Long discussion with patient and spouse guarding treatment options.  Discussed options including total knee arthroplasty versus knee arthroscopy.  Discussed option of viscosupplementation injection.  He is not experiencing significant pain at this time does wish to proceed with viscosupplementation injection.  We will plan to see him back in clinic in 6 weeks to reevaluate.  May consider follow-up steroid injection if he continues to experience swelling.  Encouraged to call with any questions or concerns that he has.  Discussed will take approximately 6 weeks before he begins to notice any significant symptom relief I do recommend ice as needed for swelling and ice at the injection site as needed.  All questions  answered.  Orders:  Orders Placed This Encounter   Procedures   • Large Joint Arthrocentesis: R knee     No orders of the defined types were placed in this encounter.          Dragon Dictation utilized.

## 2022-12-02 PROBLEM — M25.461 EFFUSION OF RIGHT KNEE: Status: ACTIVE | Noted: 2022-12-02

## 2022-12-02 PROBLEM — M17.11 PRIMARY OSTEOARTHRITIS OF RIGHT KNEE: Status: ACTIVE | Noted: 2022-12-02

## 2023-01-12 ENCOUNTER — OFFICE VISIT (OUTPATIENT)
Dept: ORTHOPEDIC SURGERY | Facility: CLINIC | Age: 68
End: 2023-01-12
Payer: MEDICARE

## 2023-01-12 VITALS — HEIGHT: 70 IN | WEIGHT: 196 LBS | BODY MASS INDEX: 28.06 KG/M2

## 2023-01-12 DIAGNOSIS — M17.11 PRIMARY OSTEOARTHRITIS OF RIGHT KNEE: Primary | ICD-10-CM

## 2023-01-12 PROCEDURE — 99213 OFFICE O/P EST LOW 20 MIN: CPT | Performed by: NURSE PRACTITIONER

## 2023-01-12 NOTE — PROGRESS NOTES
Subjective:     Patient ID: iJmi De La Vega is a 67 y.o. male.    Chief Complaint:  Follow-up DJD left knee  Viscosupplementation injection 12/1/2022  History of Present Illness  Jimi De La Vega Returns to clinic with spouse for follow-up of his right knee.  Receive viscosupplementation injection approximately 6 weeks ago with significant symptom relief.  He reports approximately 90% symptom relief has been able to return to all previously tolerated activities without any discomfort or any significant swelling.  Denies of the knee is locking, catching or giving way.  Denies any other concerns present.      Social History     Occupational History   • Occupation:      Employer: [employer]   Tobacco Use   • Smoking status: Never   • Smokeless tobacco: Never   • Tobacco comments:     None   Vaping Use   • Vaping Use: Never used   Substance and Sexual Activity   • Alcohol use: Yes     Alcohol/week: 2.0 standard drinks     Types: 1 Glasses of wine, 1 Cans of beer per week     Comment: I have 1 or 2 a day   • Drug use: No   • Sexual activity: Yes     Partners: Female     Birth control/protection: Post-menopausal     Comment: None      Past Medical History:   Diagnosis Date   • Ankle sprain 1976    [place]   • Arthritis    • Colon polyp     removed some in my 1st exam   • CTS (carpal tunnel syndrome) 2013 ?    None   • Fracture, finger 1975    None   • Primary osteoarthritis of right knee 12/2/2022   • Rotator cuff syndrome 2008    Surgery 13 or so years ago     Past Surgical History:   Procedure Laterality Date   • CARPAL TUNNEL RELEASE Right    • COLONOSCOPY     • COLONOSCOPY N/A 09/21/2020    Procedure: COLONOSCOPY, polypectomy;  Surgeon: Reginaldo Eugene MD;  Location: [facility];  Service: Gastroenterology;  Laterality: N/A;  Cecal polyp x 1; Ascending polyp x 1- cold snare; Transverse p;olyp x 3; Rectal polyp x 1   • COLONOSCOPY W/ BIOPSIES      age 51, repeat 54, repeat 60  "  • HERNIA REPAIR     • SHOULDER ROTATOR CUFF REPAIR Left    • VASECTOMY         Family History   Problem Relation Age of Onset   • Heart disease Mother    • Heart attack Mother    • Alcohol abuse Father    • COPD Father    • Breast cancer Sister    • Drug abuse Brother    • Arthritis Maternal Grandfather    • Rheumatologic disease Maternal Grandfather         None   • Rheumatologic disease Maternal Aunt         None               Objective:  Physical Exam    General: No acute distress.  Eyes: conjunctiva clear; pupils equally round and reactive  ENT: external ears and nose atraumatic; oropharynx clear  CV: no peripheral edema  Resp: normal respiratory effort  Skin: no rashes or wounds; normal turgor  Psych: mood and affect appropriate; recent and remote memory intact    Vitals:    01/12/23 1425   Weight: 88.9 kg (196 lb)   Height: 177.8 cm (70\")         01/12/23  1425   Weight: 88.9 kg (196 lb)     Body mass index is 28.12 kg/m².      Right Knee Exam     Tenderness   The patient is experiencing no tenderness.     Range of Motion   Extension: 0   Flexion: 120     Tests   Sandoval:  Medial - negative Lateral - negative  Varus: negative Valgus: negative  Lachman:  Anterior - 1+      Drawer:  Anterior - negative      Patellar apprehension: positive    Other   Erythema: absent  Sensation: normal  Pulse: present  Swelling: moderate    Comments:  Positive crepitus throughout arc of motion  Positive active patellar compression test             Assessment:        1. Primary osteoarthritis of right knee           Plan:  1.  Discussed plan of care with patient and spouse.  I do recommend repeat of the Visco injection at the beginning of June when he is eligible for the next series.  We will plan to see him back in clinic at that time encouraged to call between now and follow-up with any questions or concerns that he has.  All questions answered.    Orders:  No orders of the defined types were placed in this encounter.    No " orders of the defined types were placed in this encounter.        Dragon Dictation utilized.

## 2023-04-20 DIAGNOSIS — Z00.00 WELCOME TO MEDICARE PREVENTIVE VISIT: ICD-10-CM

## 2023-04-20 DIAGNOSIS — Z13.6 ENCOUNTER FOR SCREENING FOR CARDIOVASCULAR DISORDERS: ICD-10-CM

## 2023-04-20 DIAGNOSIS — Z00.00 ENCOUNTER FOR SUBSEQUENT ANNUAL WELLNESS VISIT IN MEDICARE PATIENT: Primary | ICD-10-CM

## 2023-04-20 DIAGNOSIS — M05.79 RHEUMATOID ARTHRITIS INVOLVING MULTIPLE SITES WITH POSITIVE RHEUMATOID FACTOR: ICD-10-CM

## 2023-04-20 DIAGNOSIS — Z12.5 SCREENING FOR PROSTATE CANCER: ICD-10-CM

## 2023-05-01 ENCOUNTER — OFFICE VISIT (OUTPATIENT)
Dept: FAMILY MEDICINE CLINIC | Facility: CLINIC | Age: 68
End: 2023-05-01
Payer: MEDICARE

## 2023-05-01 VITALS
SYSTOLIC BLOOD PRESSURE: 110 MMHG | BODY MASS INDEX: 27.77 KG/M2 | OXYGEN SATURATION: 97 % | TEMPERATURE: 98 F | WEIGHT: 194 LBS | HEART RATE: 78 BPM | DIASTOLIC BLOOD PRESSURE: 70 MMHG | HEIGHT: 70 IN

## 2023-05-01 DIAGNOSIS — M05.79 RHEUMATOID ARTHRITIS INVOLVING MULTIPLE SITES WITH POSITIVE RHEUMATOID FACTOR: ICD-10-CM

## 2023-05-01 DIAGNOSIS — Z00.00 MEDICARE ANNUAL WELLNESS VISIT, SUBSEQUENT: Primary | ICD-10-CM

## 2023-05-01 DIAGNOSIS — Z12.5 SCREENING FOR PROSTATE CANCER: ICD-10-CM

## 2023-05-01 DIAGNOSIS — Z23 IMMUNIZATION DUE: ICD-10-CM

## 2023-05-01 PROBLEM — M25.461 EFFUSION OF RIGHT KNEE: Status: RESOLVED | Noted: 2022-12-02 | Resolved: 2023-05-01

## 2023-05-01 RX ORDER — ZOSTER VACCINE RECOMBINANT, ADJUVANTED 50 MCG/0.5
50 KIT INTRAMUSCULAR
Qty: 1 EACH | Refills: 1 | Status: SHIPPED | OUTPATIENT
Start: 2023-05-01 | End: 2023-10-29

## 2023-05-01 NOTE — PROGRESS NOTES
The ABCs of the Annual Wellness Visit  Subsequent Medicare Wellness Visit    Subjective      Jimi De La Vega is a 67 y.o. male who presents for a Subsequent Medicare Wellness Visit.    The following portions of the patient's history were reviewed and   updated as appropriate: allergies, current medications, past family history, past medical history, past social history, past surgical history and problem list.    Compared to one year ago, the patient feels his physical   health is the same.    Compared to one year ago, the patient feels his mental   health is the same.    Recent Hospitalizations:  He was not admitted to the hospital during the last year.       Current Medical Providers:  Patient Care Team:  Neal Jalloh MD as PCP - General  Reyna, Dion Butterfield MD as Consulting Physician (Dermatology)  Josh Espinal MD as Consulting Physician (Ophthalmology)  Reginaldo Eugene MD as Consulting Physician (Gastroenterology)  Azra Dia MD as Consulting Physician (Rheumatology)    Outpatient Medications Prior to Visit   Medication Sig Dispense Refill   • hydroxychloroquine (PLAQUENIL) 200 MG tablet Take 1 tablet by mouth 2 (Two) Times a Day.     • ibuprofen (ADVIL,MOTRIN) 400 MG tablet Take 1 tablet by mouth Every 6 (Six) Hours As Needed for Mild Pain.     • ketoconazole (NIZORAL) 2 % cream Apply  topically to the appropriate area as directed Every 12 (Twelve) Hours. 30 g 1   • mometasone (ELOCON) 0.1 % cream        No facility-administered medications prior to visit.       No opioid medication identified on active medication list. I have reviewed chart for other potential  high risk medication/s and harmful drug interactions in the elderly.          Aspirin is not on active medication list.  Aspirin use is not indicated based on review of current medical condition/s. Risk of harm outweighs potential benefits.  .    Patient Active Problem List   Diagnosis   • Carpal tunnel syndrome of right  "wrist   • Rheumatoid arthritis involving multiple sites with positive rheumatoid factor   • Medicare annual wellness visit, subsequent   • Screening for prostate cancer   • Screening for colon cancer   • Vitiligo   • Personal history of colonic polyps   • Chronic pain of right knee   • Primary osteoarthritis of right knee     Advance Care Planning   Advance Care Planning     Advance Directive is not on file.  ACP discussion was held with the patient during this visit. Patient has an advance directive (not in EMR), copy requested.     Objective    Vitals:    23 0813   BP: 110/70   Pulse: 78   Temp: 98 °F (36.7 °C)   SpO2: 97%   Weight: 88 kg (194 lb)   Height: 177.8 cm (70\")   PainSc:   2   PainLoc: Knee     Estimated body mass index is 27.84 kg/m² as calculated from the following:    Height as of this encounter: 177.8 cm (70\").    Weight as of this encounter: 88 kg (194 lb).    BMI is >= 25 and <30. (Overweight) The following options were offered after discussion;: nutrition counseling/recommendations      Does the patient have evidence of cognitive impairment?   No    Lab Results   Component Value Date    CHLPL 192 2023    TRIG 75 2023    HDL 79 (H) 2023    LDL 99 2023    VLDL 14 2023          HEALTH RISK ASSESSMENT    Smoking Status:  Social History     Tobacco Use   Smoking Status Never   Smokeless Tobacco Never   Tobacco Comments    None     Alcohol Consumption:  Social History     Substance and Sexual Activity   Alcohol Use Not Currently   • Alcohol/week: 14.0 standard drinks   • Types: 14 Drinks containing 0.5 oz of alcohol per week    Comment: I have 1 or 2 a day     Fall Risk Screen:    ORSALEE Fall Risk Assessment was completed, and patient is at LOW risk for falls.Assessment completed on:2023    Depression Screenin/1/2023     8:18 AM   PHQ-2/PHQ-9 Depression Screening   Little Interest or Pleasure in Doing Things 0-->not at all   Feeling Down, Depressed or " Hopeless 0-->not at all   PHQ-9: Brief Depression Severity Measure Score 0       Health Habits and Functional and Cognitive Screenin/1/2023     8:00 AM   Functional & Cognitive Status   Do you have difficulty preparing food and eating? No   Do you have difficulty bathing yourself, getting dressed or grooming yourself? No   Do you have difficulty using the toilet? No   Do you have difficulty moving around from place to place? No   Do you have trouble with steps or getting out of a bed or a chair? No   Current Diet Well Balanced Diet   Dental Exam Up to date   Eye Exam Up to date   Exercise (times per week) 4 times per week   Current Exercises Include Aerobics   Do you need help using the phone?  No   Are you deaf or do you have serious difficulty hearing?  No   Do you need help with transportation? No   Do you need help shopping? No   Do you need help preparing meals?  No   Do you need help with housework?  No   Do you need help with laundry? No   Do you need help taking your medications? No   Do you need help managing money? No   Do you ever drive or ride in a car without wearing a seat belt? No   Have you felt unusual stress, anger or loneliness in the last month? No   Who do you live with? Spouse   If you need help, do you have trouble finding someone available to you? No   Do you have difficulty concentrating, remembering or making decisions? No       Age-appropriate Screening Schedule:  Refer to the list below for future screening recommendations based on patient's age, sex and/or medical conditions. Orders for these recommended tests are listed in the plan section. The patient has been provided with a written plan.    Health Maintenance   Topic Date Due   • ANNUAL WELLNESS VISIT  2023   • HEPATITIS C SCREENING  2023 (Originally 2016)   • COVID-19 Vaccine (4 - Booster for Moderna series) 06/10/2023 (Originally 2022)   • INFLUENZA VACCINE  2023   • COLORECTAL CANCER SCREENING   09/21/2023   • TDAP/TD VACCINES (3 - Td or Tdap) 02/14/2029   • Pneumococcal Vaccine 65+  Completed   • ZOSTER VACCINE  Discontinued                  CMS Preventative Services Quick Reference  Risk Factors Identified During Encounter:    Immunizations Discussed/Encouraged: Prevnar 20 (Pneumococcal 20-valent conjugate)    The above risks/problems have been discussed with the patient.  Pertinent information has been shared with the patient in the After Visit Summary.    Diagnoses and all orders for this visit:    1. Medicare annual wellness visit, subsequent (Primary)  -     Zoster Vac Recomb Adjuvanted (Shingrix) 50 MCG/0.5ML reconstituted suspension; Inject 0.5 mL into the appropriate muscle as directed by prescriber Every 6 (Six) Months for 2 doses.  Dispense: 1 each; Refill: 1    2. Screening for prostate cancer    3. Rheumatoid arthritis involving multiple sites with positive rheumatoid factor    4. Immunization due  -     Pneumococcal Conjugate Vaccine 20-Valent (PCV20)    doing well  Labs all ok  RA is stable  Happily retired.  Needs shingles #2      Follow Up:   Next Medicare Wellness visit to be scheduled in 1 year.      An After Visit Summary and PPPS were made available to the patient.

## 2023-06-14 ENCOUNTER — OFFICE VISIT (OUTPATIENT)
Dept: ORTHOPEDIC SURGERY | Facility: CLINIC | Age: 68
End: 2023-06-14
Payer: MEDICARE

## 2023-06-14 VITALS — BODY MASS INDEX: 27.77 KG/M2 | WEIGHT: 194 LBS | HEIGHT: 70 IN

## 2023-06-14 DIAGNOSIS — M17.11 PRIMARY OSTEOARTHRITIS OF RIGHT KNEE: Primary | ICD-10-CM

## 2023-06-14 NOTE — PROGRESS NOTES
Large Joint Arthrocentesis: R knee  Date/Time: 6/14/2023 8:28 AM  Consent given by: patient  Site marked: site marked  Timeout: Immediately prior to procedure a time out was called to verify the correct patient, procedure, equipment, support staff and site/side marked as required   Supporting Documentation  Indications: pain   Procedure Details  Location: knee - R knee  Preparation: Patient was prepped and draped in the usual sterile fashion  Needle gauge: 21G.  Approach: superolateral.  Medications administered: 60 mg Sodium Hyaluronate 60 MG/3ML  Patient tolerance: patient tolerated the procedure well with no immediate complications    Patient presents to clinic today for right knee viscosupplement injections.  This is the single injection of the series.  I explained details of injections as well as risks, benefits and alternatives with the patient today, had all questions answered, wished to proceed with injections.  I will see Alexis if symptoms return or if not improving.  Patient was instructed to watch for signs or symptoms of infection including redness, swelling, warmth to the touch, or significant increased pain and to contact our office immediately if any of these issues were noted.

## 2023-11-08 ENCOUNTER — ANESTHESIA (OUTPATIENT)
Dept: SURGERY | Facility: SURGERY CENTER | Age: 68
End: 2023-11-08
Payer: MEDICARE

## 2023-11-08 ENCOUNTER — HOSPITAL ENCOUNTER (OUTPATIENT)
Facility: SURGERY CENTER | Age: 68
Setting detail: HOSPITAL OUTPATIENT SURGERY
Discharge: HOME | End: 2023-11-08
Attending: INTERNAL MEDICINE | Admitting: INTERNAL MEDICINE
Payer: MEDICARE

## 2023-11-08 ENCOUNTER — ANESTHESIA EVENT (OUTPATIENT)
Dept: SURGERY | Facility: SURGERY CENTER | Age: 68
End: 2023-11-08
Payer: MEDICARE

## 2023-11-08 VITALS
BODY MASS INDEX: 27.23 KG/M2 | OXYGEN SATURATION: 96 % | SYSTOLIC BLOOD PRESSURE: 122 MMHG | HEART RATE: 54 BPM | RESPIRATION RATE: 16 BRPM | TEMPERATURE: 97.7 F | DIASTOLIC BLOOD PRESSURE: 81 MMHG | HEIGHT: 70 IN | WEIGHT: 190.2 LBS

## 2023-11-08 DIAGNOSIS — Z86.010 PERSONAL HISTORY OF COLONIC POLYPS: ICD-10-CM

## 2023-11-08 PROCEDURE — 45380 COLONOSCOPY AND BIOPSY: CPT | Performed by: INTERNAL MEDICINE

## 2023-11-08 PROCEDURE — 25810000003 LACTATED RINGERS PER 1000 ML: Performed by: INTERNAL MEDICINE

## 2023-11-08 PROCEDURE — 25010000002 LIDOCAINE 1 % SOLUTION: Performed by: ANESTHESIOLOGY

## 2023-11-08 PROCEDURE — 88305 TISSUE EXAM BY PATHOLOGIST: CPT | Performed by: INTERNAL MEDICINE

## 2023-11-08 PROCEDURE — 25010000002 PROPOFOL 10 MG/ML EMULSION: Performed by: ANESTHESIOLOGY

## 2023-11-08 RX ORDER — ONDANSETRON 2 MG/ML
4 INJECTION INTRAMUSCULAR; INTRAVENOUS ONCE AS NEEDED
Status: DISCONTINUED | OUTPATIENT
Start: 2023-11-08 | End: 2023-11-08 | Stop reason: HOSPADM

## 2023-11-08 RX ORDER — LIDOCAINE HYDROCHLORIDE 10 MG/ML
0.5 INJECTION, SOLUTION INFILTRATION; PERINEURAL ONCE AS NEEDED
Status: DISCONTINUED | OUTPATIENT
Start: 2023-11-08 | End: 2023-11-08 | Stop reason: HOSPADM

## 2023-11-08 RX ORDER — LIDOCAINE HYDROCHLORIDE 10 MG/ML
INJECTION, SOLUTION INFILTRATION; PERINEURAL AS NEEDED
Status: DISCONTINUED | OUTPATIENT
Start: 2023-11-08 | End: 2023-11-08 | Stop reason: SURG

## 2023-11-08 RX ORDER — HYDRALAZINE HYDROCHLORIDE 20 MG/ML
10 INJECTION INTRAMUSCULAR; INTRAVENOUS
Status: DISCONTINUED | OUTPATIENT
Start: 2023-11-08 | End: 2023-11-08 | Stop reason: HOSPADM

## 2023-11-08 RX ORDER — SODIUM CHLORIDE 0.9 % (FLUSH) 0.9 %
10 SYRINGE (ML) INJECTION AS NEEDED
Status: DISCONTINUED | OUTPATIENT
Start: 2023-11-08 | End: 2023-11-08 | Stop reason: HOSPADM

## 2023-11-08 RX ORDER — FENTANYL CITRATE 50 UG/ML
25 INJECTION, SOLUTION INTRAMUSCULAR; INTRAVENOUS
Status: DISCONTINUED | OUTPATIENT
Start: 2023-11-08 | End: 2023-11-08 | Stop reason: HOSPADM

## 2023-11-08 RX ORDER — SODIUM CHLORIDE, SODIUM LACTATE, POTASSIUM CHLORIDE, CALCIUM CHLORIDE 600; 310; 30; 20 MG/100ML; MG/100ML; MG/100ML; MG/100ML
1000 INJECTION, SOLUTION INTRAVENOUS CONTINUOUS
Status: DISCONTINUED | OUTPATIENT
Start: 2023-11-08 | End: 2023-11-08 | Stop reason: HOSPADM

## 2023-11-08 RX ORDER — PROPOFOL 10 MG/ML
VIAL (ML) INTRAVENOUS AS NEEDED
Status: DISCONTINUED | OUTPATIENT
Start: 2023-11-08 | End: 2023-11-08 | Stop reason: SURG

## 2023-11-08 RX ORDER — LABETALOL HYDROCHLORIDE 5 MG/ML
5 INJECTION, SOLUTION INTRAVENOUS
Status: DISCONTINUED | OUTPATIENT
Start: 2023-11-08 | End: 2023-11-08 | Stop reason: HOSPADM

## 2023-11-08 RX ADMIN — LIDOCAINE HYDROCHLORIDE 30 MG: 10 INJECTION, SOLUTION INFILTRATION; PERINEURAL at 09:43

## 2023-11-08 RX ADMIN — PROPOFOL 160 MCG/KG/MIN: 10 INJECTION, EMULSION INTRAVENOUS at 09:43

## 2023-11-08 RX ADMIN — SODIUM CHLORIDE, POTASSIUM CHLORIDE, SODIUM LACTATE AND CALCIUM CHLORIDE 1000 ML: 600; 310; 30; 20 INJECTION, SOLUTION INTRAVENOUS at 09:00

## 2023-11-08 RX ADMIN — PROPOFOL 100 MG: 10 INJECTION, EMULSION INTRAVENOUS at 09:43

## 2023-11-08 NOTE — H&P
Patient Care Team:  Neal Jalloh MD as PCP - General  Reyna, Dion Butterfield MD as Consulting Physician (Dermatology)  Josh Espinal MD as Consulting Physician (Ophthalmology)  Reginaldo Eugene MD as Consulting Physician (Gastroenterology)  Azra Dia MD as Consulting Physician (Rheumatology)    CHIEF COMPLAINT: Personal hx colon polyps    HISTORY OF PRESENT ILLNESS:  Last exam was 2020    Past Medical History:   Diagnosis Date    Ankle sprain 1976    [place]    Colon polyp     removed some in my 1st exam    CTS (carpal tunnel syndrome) 2013 ?    None    Fracture, finger 1975    None    Primary osteoarthritis of right knee 12/02/2022    Rotator cuff syndrome 2008    Surgery 13 or so years ago     Past Surgical History:   Procedure Laterality Date    CARPAL TUNNEL RELEASE Right     COLONOSCOPY      COLONOSCOPY N/A 09/21/2020    Procedure: COLONOSCOPY, polypectomy;  Surgeon: Reginaldo Eugene MD;  Location: [facility] OR;  Service: Gastroenterology;  Laterality: N/A;  Cecal polyp x 1; Ascending polyp x 1- cold snare; Transverse p;olyp x 3; Rectal polyp x 1    COLONOSCOPY W/ BIOPSIES      age 51, repeat 54, repeat 60    HERNIA REPAIR      SHOULDER ROTATOR CUFF REPAIR Left     VASECTOMY       Family History   Problem Relation Age of Onset    Heart disease Mother     Heart attack Mother     Alcohol abuse Father     COPD Father     Breast cancer Sister     Drug abuse Brother     Alcohol abuse Brother     Arthritis Maternal Grandfather     Rheumatologic disease Maternal Grandfather         None    Rheumatologic disease Maternal Aunt         None    No Known Problems Daughter     No Known Problems Daughter      Social History     Tobacco Use    Smoking status: Never    Smokeless tobacco: Never    Tobacco comments:     None   Vaping Use    Vaping Use: Never used   Substance Use Topics    Alcohol use: Yes     Alcohol/week: 2.0 standard drinks of alcohol     Types: 1 Glasses of wine, 1 Cans of beer  "per week     Comment: I have 1 or 2 a day    Drug use: No     Medications Prior to Admission   Medication Sig Dispense Refill Last Dose    hydroxychloroquine (PLAQUENIL) 200 MG tablet Take 1 tablet by mouth 2 (Two) Times a Day.   Past Week    ibuprofen (ADVIL,MOTRIN) 400 MG tablet Take 1 tablet by mouth Every 6 (Six) Hours As Needed for Mild Pain.   Past Month    ketoconazole (NIZORAL) 2 % cream Apply  topically to the appropriate area as directed Every 12 (Twelve) Hours. 30 g 1     mometasone (ELOCON) 0.1 % cream         Allergies:  Patient has no known allergies.    REVIEW OF SYSTEMS:  Please see the above history of present illness for pertinent positives and negatives.  The remainder of the patient's systems have been reviewed and are negative.     Vital Signs  Temp:  [98 °F (36.7 °C)] 98 °F (36.7 °C)  Heart Rate:  [62] 62  Resp:  [18] 18  BP: (119)/(80) 119/80    Flowsheet Rows      Flowsheet Row First Filed Value   Admission Height 177.8 cm (70\") Documented at 11/06/2023 1354   Admission Weight 86.2 kg (190 lb) Documented at 11/06/2023 1354             Physical Exam:  Physical Exam   Constitutional: Patient appears well-developed and well-nourished and in no acute distress   HEENT:   Head: Normocephalic and atraumatic.   Eyes:  Pupils are equal, round, and reactive to light. EOM are intact. Sclerae are anicteric and non-injected.  Mouth and Throat: Patient has moist mucous membranes. Oropharynx is clear of any erythema or exudate.     Neck: Neck supple. No JVD present. No thyromegaly present. No lymphadenopathy present.  Cardiovascular: Regular rate, regular rhythm, S1 normal and S2 normal.  Exam reveals no gallop and no friction rub.  No murmur heard.  Pulmonary/Chest: Lungs are clear to auscultation bilaterally. No respiratory distress. No wheezes. No rhonchi. No rales.   Abdominal: Soft. Bowel sounds are normal. No distension and no mass. There is no hepatosplenomegaly. There is no tenderness. "   Musculoskeletal: Normal Muscle tone  Extremities: No edema. Pulses are palpable in all 4 extremities.  Neurological: Patient is alert and oriented to person, place, and time. Cranial nerves II-XII are grossly intact with no focal deficits.  Skin: Skin is warm. No rash noted. Nails show no clubbing.  No cyanosis or erythema.    Debilities/Disabilities Identified: None  Emotional Behavior: Appropriate     Results Review:   I reviewed the patient's new clinical results.    Lab Results (most recent)       None            Imaging Results (Most Recent)       None          reviewed    ECG/EMG Results (most recent)       None          reviewed    Assessment & Plan   Personal hx colon polyps/  colonoscopy      I discussed the patient's findings and my recommendations with patient.     Reginaldo Eugene MD  11/08/23  09:05 EST    Time: 10 min prior to procedure.

## 2023-11-08 NOTE — ANESTHESIA PREPROCEDURE EVALUATION
Anesthesia Evaluation     no history of anesthetic complications:   NPO Solid Status: > 8 hours  NPO Liquid Status: > 2 hours           Airway   Mallampati: II  Neck ROM: full  no difficulty expected  Dental - normal exam     Pulmonary - negative pulmonary ROS and normal exam   (-) COPD, asthma, sleep apnea, not a smoker    PE comment: nonlabored  Cardiovascular - negative cardio ROS and normal exam  Exercise tolerance: good (4-7 METS)    Rhythm: regular  Rate: normal    (-) hypertension, valvular problems/murmurs, past MI, CAD, dysrhythmias, angina      Neuro/Psych- negative ROS  (-) seizures, TIA, CVA  GI/Hepatic/Renal/Endo - negative ROS   (-) GERD, liver disease, no renal disease, diabetes, no thyroid disorder    Musculoskeletal     (+) arthralgias, chronic pain  Abdominal    Substance History      OB/GYN          Other   arthritis, autoimmune disease rheumatoid arthritis,                   Anesthesia Plan    ASA 2     MAC       Anesthetic plan, risks, benefits, and alternatives have been provided, discussed and informed consent has been obtained with: patient.    CODE STATUS:

## 2023-11-08 NOTE — BRIEF OP NOTE
COLONOSCOPY  Progress Note    Jimi De La Vega  11/8/2023    Pre-op Diagnosis:   Personal history of colonic polyps [Z86.010]       Post-Op Diagnosis Codes:     * Personal history of colonic polyps [Z86.010]     * Diverticulosis [K57.90]     * Colon polyp [K63.5]    Procedure/CPT® Codes:        Procedure(s):  COLONOSCOPY to Cecum with Polypectomy              Surgeon(s):  Reginaldo Eugene MD    Anesthesia: Monitored Anesthesia Care    Staff:   Endo Technician: Yovana Vanegas RN  Endo Nurse: Trina Briggs RN         Estimated Blood Loss: none    Urine Voided: * No values recorded between 11/8/2023  9:39 AM and 11/8/2023 10:07 AM *    Specimens:                Specimens       ID Source Type Tests Collected By Collected At Frozen?    A Large Intestine, Right / Ascending Colon Tissue TISSUE PATHOLOGY EXAM   Reginaldo Eugene MD 11/8/23 0958 No    Description: Ascending Polyp    This specimen was not marked as sent.    B Large Intestine, Transverse Colon Tissue TISSUE PATHOLOGY EXAM   Reginaldo Eugene MD 11/8/23 1000 No    Description: Transverse Polyp    This specimen was not marked as sent.    C Large Intestine, Rectum Tissue TISSUE PATHOLOGY EXAM   Reginaldo Eugene MD 11/8/23 1005 No    Description: Rectal Polyp    This specimen was not marked as sent.                  Drains: * No LDAs found *    Findings: Colon to Ti good prep  Sigmoid Diverticulosis  Polyps-3-Biopsy        Complications: None          Reginaldo Eugene MD     Date: 11/8/2023  Time: 10:08 EST

## 2023-11-08 NOTE — ANESTHESIA POSTPROCEDURE EVALUATION
"Patient: Jimi De La Vega    Procedure Summary       Date: 11/08/23 Room / Location: [facility]    Anesthesia Start: 0939 Anesthesia Stop: 1010    Procedure: COLONOSCOPY to Cecum with Polypectomy Diagnosis:       Personal history of colonic polyps      Diverticulosis      Colon polyp      (Personal history of colonic polyps [Z86.010])    Surgeons: Reginaldo Eugene MD Provider: Aroldo Mata MD    Anesthesia Type: MAC ASA Status: 2            Anesthesia Type: MAC    Vitals  Vitals Value Taken Time   /70 11/08/23 1010   Temp     Pulse 56 11/08/23 1011   Resp     SpO2 98 % 11/08/23 1011   Vitals shown include unfiled device data.        Post Anesthesia Care and Evaluation    Patient location during evaluation: bedside  Pain management: adequate    Airway patency: patent  Anesthetic complications: No anesthetic complications    Cardiovascular status: acceptable  Respiratory status: acceptable  Hydration status: acceptable    Comments: */80 (BP Location: Left arm, Patient Position: Lying)   Pulse 62   Temp 36.7 °C (98 °F) (Temporal)   Resp 18   Ht 177.8 cm (70\")   Wt 86.3 kg (190 lb 3.2 oz)   SpO2 96%   BMI 27.29 kg/m²       "

## 2023-11-09 LAB
LAB AP CASE REPORT: NORMAL
PATH REPORT.FINAL DX SPEC: NORMAL
PATH REPORT.GROSS SPEC: NORMAL

## 2023-11-20 ENCOUNTER — TELEPHONE (OUTPATIENT)
Dept: FAMILY MEDICINE CLINIC | Facility: CLINIC | Age: 68
End: 2023-11-20

## 2023-11-20 ENCOUNTER — TELEMEDICINE (OUTPATIENT)
Dept: FAMILY MEDICINE CLINIC | Facility: CLINIC | Age: 68
End: 2023-11-20
Payer: MEDICARE

## 2023-11-20 DIAGNOSIS — U07.1 COVID-19: Primary | ICD-10-CM

## 2023-11-20 PROCEDURE — 99213 OFFICE O/P EST LOW 20 MIN: CPT | Performed by: NURSE PRACTITIONER

## 2023-11-20 NOTE — TELEPHONE ENCOUNTER
"  Caller: Jimi De La Vega \"PEDRO LUIS\"    Relationship to patient: Self    Best call back number: [phone]     Date of positive COVID19 test: 11/20 BUT WIFE TESTED POSITIVE FRIDAY    COVID19 symptoms: SORE THROAT, FATIGUE, COUGH    Additional information or concerns: PLEASE ADVISE IF DR RODGERS CAN SEND IN MEDICATION OR IF HE NEEDS TO BE SEEN FIRST.     What is the patients preferred pharmacy: [facility] [id number] - [place] - [address] - [phone]  - [phone] FX     "

## 2023-11-20 NOTE — PATIENT INSTRUCTIONS
Treatment for Covid is rest, drink plenty of fluids, Tylenol/ibuprofen as needed for body aches or fever, vitamin C, and zinc.  Mucinex as needed if any chest congestion.  Quarantine for at least 5 days from onset of symptoms.  On day 6, if symptoms have improved and no fever for 24 hours, may come out of quarantine however need to continue to wear mask for additional 5 days.

## 2023-11-20 NOTE — PROGRESS NOTES
Jimi De La Vega is a 68 y.o. who presents today for an E-visit with complaints of tested + for Covid.     You have chosen to receive care through a telehealth visit.  Do you consent to use a video/audio connection for your medical care today? Yes    Jimi De La Vega was located at their residence.  MELANY Whiteside was located at [facility] office    Participants:  Patient and provider    Start time:  1636   End time:  1641  Time spent caring for the patient was 5 - 10 min.    Chief Complaint   Patient presents with    covid 19       Upper Respiratory Infection: Patient complains of symptoms of a URI. Symptoms include congestion, cough, fever, and fatigue . Onset of symptoms was 1 day ago, gradually worsening since that time. He also c/o achiness, congestion, and body aches  for the past 1 day .  He is drinking moderate amounts of fluids. Evaluation to date: none. Treatment to date:  Tylenol .  Ill contacts at home or school or work discussed.  Wife tested + for Covid on Saturday.    He tested + for Covid today.      The following portions of the patient's history were reviewed and updated as appropriate: allergies, current medications, past family history, past medical history, past social history, past surgical history and problem list.    Exam:  Patient stated TMax 100 today.  Decreased after Tylenol.    Gen: Mildly ill appearing, alert, NAD      Assessment & Plan   Diagnoses and all orders for this visit:    1. COVID-19 (Primary)  -     Nirmatrelvir&Ritonavir 300/100 (PAXLOVID) 20 x 150 MG & 10 x 100MG tablet therapy pack tablet; Take 3 tablets by mouth 2 (Two) Times a Day for 5 days.  Dispense: 30 tablet; Refill: 0           Tylenol or Advil as needed for pain, fever, muscle aches  Plenty of fluids  Hand washing discussed  Off work or school note given if needed.  Warm tea for throat.  Pros and cons of antibiotic use discussed.  Instructed to notify us if symptoms worsen or do not improve.         Treatment for Covid is rest, drink plenty of fluids, Tylenol/ibuprofen as needed for body aches or fever, vitamin C, and zinc.  Mucinex as needed if any chest congestion.  Quarantine for at least 5 days from onset of symptoms.  On day 6, if symptoms have improved and no fever for 24 hours, may come out of quarantine however need to continue to wear mask for additional 5 days.      Paula Roberts, APRN  Family Practice  [facility]

## 2024-04-24 DIAGNOSIS — Z00.00 HEALTHCARE MAINTENANCE: ICD-10-CM

## 2024-04-24 DIAGNOSIS — Z12.5 SCREENING FOR PROSTATE CANCER: Primary | ICD-10-CM

## 2024-04-24 DIAGNOSIS — M05.79 RHEUMATOID ARTHRITIS INVOLVING MULTIPLE SITES WITH POSITIVE RHEUMATOID FACTOR: ICD-10-CM

## 2024-04-26 DIAGNOSIS — Z00.00 HEALTHCARE MAINTENANCE: Primary | ICD-10-CM

## 2024-05-01 ENCOUNTER — TELEPHONE (OUTPATIENT)
Dept: ORTHOPEDIC SURGERY | Facility: CLINIC | Age: 69
End: 2024-05-01
Payer: MEDICARE

## 2024-05-01 NOTE — TELEPHONE ENCOUNTER
Caller: PATIENT    Relationship to patient: SELF     Best call back number: [phone]    Chief complaint: RIGHT KNEE PAIN    Type of visit: GEL INJECTION    Requested date: NEXT AVAILABLE APPT     Additional notes: PATIENT WAS CALLING TO SCHEDULE A GEL INJECTION WITH MS. [facility] FOR THE RIGHT KNEE. THANK YOU!

## 2024-05-01 NOTE — TELEPHONE ENCOUNTER
Returned call to patient to get gel injection scheduled. Patient asked if he could call back later this afternoon to get that scheduled.     If patient calls back please send call to Sharlene at the office.

## 2024-05-13 ENCOUNTER — OFFICE VISIT (OUTPATIENT)
Dept: FAMILY MEDICINE CLINIC | Facility: CLINIC | Age: 69
End: 2024-05-13
Payer: MEDICARE

## 2024-05-13 VITALS
OXYGEN SATURATION: 98 % | HEIGHT: 70 IN | WEIGHT: 194 LBS | BODY MASS INDEX: 27.77 KG/M2 | SYSTOLIC BLOOD PRESSURE: 120 MMHG | DIASTOLIC BLOOD PRESSURE: 80 MMHG | TEMPERATURE: 97.3 F | HEART RATE: 77 BPM

## 2024-05-13 DIAGNOSIS — M05.79 RHEUMATOID ARTHRITIS INVOLVING MULTIPLE SITES WITH POSITIVE RHEUMATOID FACTOR: ICD-10-CM

## 2024-05-13 DIAGNOSIS — Z12.5 SCREENING FOR PROSTATE CANCER: ICD-10-CM

## 2024-05-13 DIAGNOSIS — Z00.00 MEDICARE ANNUAL WELLNESS VISIT, SUBSEQUENT: Primary | ICD-10-CM

## 2024-05-13 DIAGNOSIS — Z13.6 ENCOUNTER FOR SCREENING FOR CARDIOVASCULAR DISORDERS: ICD-10-CM

## 2024-05-13 PROCEDURE — 1160F RVW MEDS BY RX/DR IN RCRD: CPT | Performed by: FAMILY MEDICINE

## 2024-05-13 PROCEDURE — 1125F AMNT PAIN NOTED PAIN PRSNT: CPT | Performed by: FAMILY MEDICINE

## 2024-05-13 PROCEDURE — G0439 PPPS, SUBSEQ VISIT: HCPCS | Performed by: FAMILY MEDICINE

## 2024-05-13 PROCEDURE — 1159F MED LIST DOCD IN RCRD: CPT | Performed by: FAMILY MEDICINE

## 2024-05-13 NOTE — PROGRESS NOTES
The ABCs of the Annual Wellness Visit  Subsequent Medicare Wellness Visit    Subjective      Jimi De La Vega is a 69 y.o. male who presents for a Subsequent Medicare Wellness Visit.    The following portions of the patient's history were reviewed and   updated as appropriate: allergies, current medications, past family history, past medical history, past social history, past surgical history, and problem list.    Compared to one year ago, the patient feels his physical   health is the same.    Compared to one year ago, the patient feels his mental   health is the same.    Recent Hospitalizations:  He was not admitted to the hospital during the last year.       Current Medical Providers:  Patient Care Team:  Neal Jalloh MD as PCP - General  Reyna, Dion Butterfield MD as Consulting Physician (Dermatology)  Josh Espinal MD as Consulting Physician (Ophthalmology)  Reginaldo Eugene MD as Consulting Physician (Gastroenterology)  Azra Dia MD as Consulting Physician (Rheumatology)    Outpatient Medications Prior to Visit   Medication Sig Dispense Refill    hydroxychloroquine (PLAQUENIL) 200 MG tablet Take 1 tablet by mouth 2 (Two) Times a Day.      ibuprofen (ADVIL,MOTRIN) 400 MG tablet Take 1 tablet by mouth Every 6 (Six) Hours As Needed for Mild Pain.      ketoconazole (NIZORAL) 2 % cream Apply  topically to the appropriate area as directed Every 12 (Twelve) Hours. 30 g 1    mometasone (ELOCON) 0.1 % cream  (Patient not taking: Reported on 5/13/2024)       No facility-administered medications prior to visit.       No opioid medication identified on active medication list. I have reviewed chart for other potential  high risk medication/s and harmful drug interactions in the elderly.        Aspirin is not on active medication list.  Aspirin use is not indicated based on review of current medical condition/s. Risk of harm outweighs potential benefits.  .    Patient Active Problem List   Diagnosis  "   Carpal tunnel syndrome of right wrist    Rheumatoid arthritis involving multiple sites with positive rheumatoid factor    Medicare annual wellness visit, subsequent    Screening for prostate cancer    Screening for colon cancer    Vitiligo    Personal history of colonic polyps    Chronic pain of right knee    Primary osteoarthritis of right knee     Advance Care Planning   Advance Care Planning     Advance Directive is not on file.  ACP discussion was held with the patient during this visit. Patient has an advance directive (not in EMR), copy requested.     Objective    Vitals:    24 1013   BP: 120/80   BP Location: Left arm   Patient Position: Sitting   Cuff Size: Adult   Pulse: 77   Temp: 97.3 °F (36.3 °C)   SpO2: 98%   Weight: 88 kg (194 lb)   Height: 177.8 cm (70\")     Estimated body mass index is 27.84 kg/m² as calculated from the following:    Height as of this encounter: 177.8 cm (70\").    Weight as of this encounter: 88 kg (194 lb).    BMI is >= 25 and <30. (Overweight) The following options were offered after discussion;: none (medical contraindication)      Does the patient have evidence of cognitive impairment?   No    Lab Results   Component Value Date    CHLPL 184 2024    TRIG 86 2024    HDL 68 (H) 2024     2024    VLDL 16 2024          HEALTH RISK ASSESSMENT    Smoking Status:  Social History     Tobacco Use   Smoking Status Never   Smokeless Tobacco Never   Tobacco Comments    None     Alcohol Consumption:  Social History     Substance and Sexual Activity   Alcohol Use Yes    Alcohol/week: 12.0 standard drinks of alcohol    Types: 1 Glasses of wine, 1 Cans of beer, 10 Drinks containing 0.5 oz of alcohol per week    Comment: I have 1 or 2 a day     Fall Risk Screen:    ROSALEE Fall Risk Assessment was completed, and patient is at LOW risk for falls.Assessment completed on:2024    Depression Screenin/13/2024    10:18 AM   PHQ-2/PHQ-9 Depression " Screening   Little Interest or Pleasure in Doing Things 0-->not at all   Feeling Down, Depressed or Hopeless 0-->not at all   PHQ-9: Brief Depression Severity Measure Score 0       Health Habits and Functional and Cognitive Screenin/6/2024     9:47 AM   Functional & Cognitive Status   Do you have difficulty preparing food and eating? No   Do you have difficulty bathing yourself, getting dressed or grooming yourself? No   Do you have difficulty using the toilet? No   Do you have difficulty moving around from place to place? No   Do you have trouble with steps or getting out of a bed or a chair? No   Current Diet Limited Junk Food   Dental Exam Up to date   Eye Exam Up to date   Exercise (times per week) 5 times per week   Current Exercises Include Aerobics;Cardiovascular Workout;Gardening;Hiking;Home Exercise Program (TV, Computer, Etc.);Home Fitness Gym;House Cleaning;Light Weights;Walking;Weightlifting;Yard Work   Do you need help using the phone?  No   Are you deaf or do you have serious difficulty hearing?  No   Do you need help to go to places out of walking distance? No   Do you need help shopping? No   Do you need help preparing meals?  No   Do you need help with housework?  No   Do you need help with laundry? No   Do you need help taking your medications? No   Do you need help managing money? No   Do you ever drive or ride in a car without wearing a seat belt? No   Have you felt unusual stress, anger or loneliness in the last month? No   Who do you live with? Spouse   If you need help, do you have trouble finding someone available to you? No   Have you been bothered in the last four weeks by sexual problems? No   Do you have difficulty concentrating, remembering or making decisions? No       Age-appropriate Screening Schedule:  Refer to the list below for future screening recommendations based on patient's age, sex and/or medical conditions. Orders for these recommended tests are listed in the plan  section. The patient has been provided with a written plan.    Health Maintenance   Topic Date Due    HEPATITIS C SCREENING  Never done    ANNUAL WELLNESS VISIT  05/01/2024    BMI FOLLOWUP  05/01/2024    COVID-19 Vaccine (4 - 2023-24 season) 12/31/2024 (Originally 9/1/2023)    RSV Vaccine - Adults (1 - 1-dose 60+ series) 12/31/2024 (Originally 5/13/2016)    INFLUENZA VACCINE  08/01/2024    COLORECTAL CANCER SCREENING  11/08/2026    TDAP/TD VACCINES (3 - Td or Tdap) 02/14/2029    Pneumococcal Vaccine 65+  Completed    ZOSTER VACCINE  Discontinued                  CMS Preventative Services Quick Reference  Risk Factors Identified During Encounter:    None Identified    The above risks/problems have been discussed with the patient.  Pertinent information has been shared with the patient in the After Visit Summary.    Diagnoses and all orders for this visit:    1. Medicare annual wellness visit, subsequent (Primary)  -     CBC (No Diff); Future  -     Comprehensive Metabolic Panel; Future  -     Lipid Panel; Future  -     Urinalysis With Microscopic If Indicated (No Culture) - Urine, Clean Catch; Future    2. Rheumatoid arthritis involving multiple sites with positive rheumatoid factor  -     CBC (No Diff); Future  -     Comprehensive Metabolic Panel; Future  -     Lipid Panel; Future  -     Urinalysis With Microscopic If Indicated (No Culture) - Urine, Clean Catch; Future    3. Screening for prostate cancer  -     PSA Screen; Future    4. Encounter for screening for cardiovascular disorders  -     Lipid Panel; Future    Doing well  Enjoying alf  Will see ortho for right knee injection  Labs ok  RA is stable    Follow Up:   Next Medicare Wellness visit to be scheduled in 1 year.      An After Visit Summary and PPPS were made available to the patient.

## 2024-05-14 ENCOUNTER — CLINICAL SUPPORT (OUTPATIENT)
Dept: ORTHOPEDIC SURGERY | Facility: CLINIC | Age: 69
End: 2024-05-14
Payer: MEDICARE

## 2024-05-14 VITALS — WEIGHT: 194 LBS | BODY MASS INDEX: 27.77 KG/M2 | HEIGHT: 70 IN

## 2024-05-14 DIAGNOSIS — M25.561 RIGHT KNEE PAIN, UNSPECIFIED CHRONICITY: ICD-10-CM

## 2024-05-14 DIAGNOSIS — M17.11 PRIMARY OSTEOARTHRITIS OF RIGHT KNEE: Primary | ICD-10-CM

## 2024-05-14 NOTE — PROGRESS NOTES
Subjective:     Patient ID: Jimi De La Vega is a 69 y.o. male.    Chief Complaint:  Follow-up DJD right knee  History of Present Illness  Jimi De La Vega  The patient returns to clinic today for follow-up of his right knee.    He has begun to experience discomfort in his right knee. He has previously undergone Visco supplementation injections, which have resulted in significant symptom improvement. Maximum tenderness is present at the anterior and medial compartment of the knee. He denies any significant swelling. He has maintained his walking regimen and other exercises, which have significantly alleviated his symptoms. He denies any other concerns at present.     Social History     Occupational History    Occupation:      Comment: sold business 2023     Employer: [employer]   Tobacco Use    Smoking status: Never    Smokeless tobacco: Never    Tobacco comments:     None   Vaping Use    Vaping status: Never Used   Substance and Sexual Activity    Alcohol use: Yes     Alcohol/week: 12.0 standard drinks of alcohol     Types: 1 Glasses of wine, 1 Cans of beer, 10 Drinks containing 0.5 oz of alcohol per week     Comment: I have 1 or 2 a day    Drug use: No    Sexual activity: Yes     Partners: Female     Birth control/protection: Post-menopausal     Comment: None      Past Medical History:   Diagnosis Date    Ankle sprain 1976    [place]    Colon polyp     removed some in my 1st exam    CTS (carpal tunnel syndrome) 2013 ?    None    Fracture, finger 1975    None    Primary osteoarthritis of right knee 12/02/2022    Rotator cuff syndrome 2008    Surgery 13 or so years ago     Past Surgical History:   Procedure Laterality Date    CARPAL TUNNEL RELEASE Right     COLONOSCOPY      COLONOSCOPY N/A 09/21/2020    Procedure: COLONOSCOPY, polypectomy;  Surgeon: Reginaldo Eugene MD;  Location: [facility];  Service: Gastroenterology;  Laterality: N/A;  Cecal polyp x 1; Ascending polyp x 1-  "cold snare; Transverse p;olyp x 3; Rectal polyp x 1    COLONOSCOPY N/A 11/8/2023    Procedure: COLONOSCOPY to Cecum with Polypectomy;  Surgeon: Reginaldo Eugene MD;  [place] [facility] [place]  Service: Gastroenterology;  Laterality: N/A;  Ascending Polyp, Transverse Polyp, Rectal Polyp    COLONOSCOPY W/ BIOPSIES      age 51, repeat 54, repeat 60    HERNIA REPAIR      SHOULDER ROTATOR CUFF REPAIR Left     VASECTOMY         Family History   Problem Relation Age of Onset    Heart disease Mother     Heart attack Mother     Alcohol abuse Father     COPD Father     Breast cancer Sister     Drug abuse Brother     Alcohol abuse Brother     Arthritis Maternal Grandfather     Rheumatologic disease Maternal Grandfather         None    Rheumatologic disease Maternal Aunt         None    No Known Problems Daughter     No Known Problems Daughter                Objective:  Physical Exam    General: No acute distress.  Eyes: conjunctiva clear; pupils equally round and reactive  ENT: external ears and nose atraumatic; oropharynx clear  CV: no peripheral edema  Resp: normal respiratory effort  Skin: no rashes or wounds; normal turgor  Psych: mood and affect appropriate; recent and remote memory intact    Vitals:    05/14/24 0931   Weight: 88 kg (194 lb)   Height: 177.8 cm (70\")         05/14/24  0931   Weight: 88 kg (194 lb)     Body mass index is 27.84 kg/m².      Ortho Exam     The right knee has a range of motion from 0 to 125 degrees. It is stable to varus and valgus stress at 0 degrees and 30 degrees. There is positive tenderness along the medial compartment of the patella. Active patellar compression test is positive. Crepitus is noted in the range of motion. Medial and lateral Sandoval's exam is negative. There is 1+ anterior Lachman. Quad strength is 5 out of 5.    Imaging:  Right Knee X-Ray  Indication: Pain    AP, Lateral, and Ord views    Findings:  No fracture  No bony lesion  Normal soft tissues  Advanced " tricompartmental osteoarthritis with bone-on-bone articulation along the medial compartment patellofemoral compartment with reactive osteophytes both medial compartment patellofemoral    prior studies were available for comparison.    Assessment:        1. Right knee pain, unspecified chronicity    2. Primary osteoarthritis of right knee           Plan:  - Large Joint Arthrocentesis: R knee on 5/14/2024 9:48 AM  Indications: pain  Details: 21 G needle, anterolateral approach  Medications: 88 mg Hyaluronan 88 MG/4ML  Outcome: tolerated well, no immediate complications  Consent was given by the patient. Immediately prior to procedure a time out was called to verify the correct patient, procedure, equipment, support staff and site/side marked as required. Patient was prepped and draped in the usual sterile fashion.         We discussed imaging proven discussed also option of proceeding with viscosupplementation injection right knee which she is experiencing difficult symptom relief from the past.  He does wish to proceed with repeat viscosupplementation injection right knee.  I do recommend application of ice injection site this evening.  Strongly recommend he continue with the strengthening exercises and walking regimen for exercise.  We can repeat the scale again 6-month interval.  If symptoms get worse we will gladly see him back in clinic sooner if needed.  All questions answered.          Orders:  Orders Placed This Encounter   Procedures    XR Knee 3+ View With [place] Right     No orders of the defined types were placed in this encounter.          Dragon dictation utilized    Transcribed from ambient dictation for MELANY Lau by Taurus Knutson.  05/14/24   13:10 EDT    Patient or patient representative verbalized consent to the visit recording.  I have personally performed the services described in this document as transcribed by the above individual, and it is both accurate and complete.

## 2024-11-05 ENCOUNTER — OFFICE VISIT (OUTPATIENT)
Dept: FAMILY MEDICINE CLINIC | Facility: CLINIC | Age: 69
End: 2024-11-05
Payer: MEDICARE

## 2024-11-05 VITALS
HEART RATE: 70 BPM | WEIGHT: 194 LBS | DIASTOLIC BLOOD PRESSURE: 76 MMHG | HEIGHT: 70 IN | BODY MASS INDEX: 27.77 KG/M2 | OXYGEN SATURATION: 96 % | SYSTOLIC BLOOD PRESSURE: 118 MMHG | TEMPERATURE: 97.8 F

## 2024-11-05 DIAGNOSIS — J01.00 ACUTE NON-RECURRENT MAXILLARY SINUSITIS: Primary | ICD-10-CM

## 2024-11-05 PROCEDURE — 1125F AMNT PAIN NOTED PAIN PRSNT: CPT | Performed by: FAMILY MEDICINE

## 2024-11-05 PROCEDURE — 99213 OFFICE O/P EST LOW 20 MIN: CPT | Performed by: FAMILY MEDICINE

## 2024-11-05 RX ORDER — AZITHROMYCIN 250 MG/1
TABLET, FILM COATED ORAL
Qty: 6 TABLET | Refills: 0 | Status: SHIPPED | OUTPATIENT
Start: 2024-11-05

## 2024-11-05 NOTE — PROGRESS NOTES
"  Chief Complaint   Patient presents with    Cough    Sinusitis     Upper Respiratory Infection: Patient complains of possible sinusitis. Symptoms include congestion, cough, fever, and sore throat. Onset of symptoms was 10 days ago, gradually worsening since that time. .  He is drinking plenty of fluids. Evaluation to date: none. Treatment to date: none.  Ill contacts at home or school or work discussed.  Vitals:    11/05/24 1532   BP: 118/76   BP Location: Left arm   Patient Position: Sitting   Cuff Size: Adult   Pulse: 70   Temp: 97.8 °F (36.6 °C)   SpO2: 96%   Weight: 88 kg (194 lb)   Height: 177.8 cm (70\")     Gen: mildly ill appearing, alert  Ears: Tm's bulging without redness  Nose:  Congestion  Throat:  Red without exudate, some drainage, tonsils okay  Neck: no LAD  Lung: good air movement, regular RR  Heart: RR without murmur  Skin: no rash.  Assessment & Plan   Diagnoses and all orders for this visit:    1. Acute non-recurrent maxillary sinusitis (Primary)  -     azithromycin (Zithromax Z-Anuj) 250 MG tablet; Take 2 tablets by mouth on day 1, then 1 tablet daily on days 2-5  Dispense: 6 tablet; Refill: 0         There are no Patient Instructions on file for this visit.  Tylenol or Advil as needed for pain, fever, muscle aches  Plenty of fluids  Hand washing discussed  Off work or school note given if needed.  Warm tea for throat.  Pros and cons of antibiotic use discussed    Dr. Neal Jalloh MD  [place]  [facility]  Answers submitted by the patient for this visit:  Other (Submitted on 11/4/2024)  Please describe your symptoms.: Head cold type symptoms , Drainage  Have you had these symptoms before?: Yes  How long have you been having these symptoms?: 1-2 weeks  Primary Reason for Visit (Submitted on 11/4/2024)  What is the primary reason for your visit?: Problem Not Listed    "

## 2025-03-03 ENCOUNTER — TELEPHONE (OUTPATIENT)
Dept: ORTHOPEDIC SURGERY | Facility: CLINIC | Age: 70
End: 2025-03-03

## 2025-03-03 NOTE — TELEPHONE ENCOUNTER
"Caller: Jimi De La Vega \"PEDRO LUIS\"    Relationship to patient: Self    Best call back number: [phone] (home)     Chief complaint: RT KNEE PAIN    Type of visit: GEL INJECTION    Requested date: THE WEEK OF 3/17/25    "

## 2025-03-19 ENCOUNTER — CLINICAL SUPPORT (OUTPATIENT)
Dept: ORTHOPEDIC SURGERY | Facility: CLINIC | Age: 70
End: 2025-03-19
Payer: MEDICARE

## 2025-03-19 VITALS — WEIGHT: 194 LBS | HEIGHT: 70 IN | BODY MASS INDEX: 27.77 KG/M2

## 2025-03-19 DIAGNOSIS — M17.11 PRIMARY OSTEOARTHRITIS OF RIGHT KNEE: Primary | ICD-10-CM

## 2025-03-19 NOTE — PROGRESS NOTES
Large Joint Arthrocentesis: R knee  Date/Time: 3/19/2025 1:12 PM  Consent given by: patient  Site marked: site marked  Timeout: Immediately prior to procedure a time out was called to verify the correct patient, procedure, equipment, support staff and site/side marked as required   Supporting Documentation  Indications: pain   Procedure Details  Location: knee - R knee  Preparation: Patient was prepped and draped in the usual sterile fashion  Needle gauge: 21G.  Approach: superolateral.  Medications administered: 88 mg Hyaluronan 88 MG/4ML  Patient tolerance: patient tolerated the procedure well with no immediate complications        Patient presents to clinic today for right knee viscosupplement injections.  This is the single injection of the series.  I explained details of injections as well as risks, benefits and alternatives with the patient today, had all questions answered, wished to proceed with injections. Patient was instructed to watch for signs or symptoms of infection including redness, swelling, warmth to the touch, or significant increased pain and to contact our office immediately if any of these issues were noted.

## 2025-05-14 DIAGNOSIS — Z00.00 MEDICARE ANNUAL WELLNESS VISIT, SUBSEQUENT: Primary | ICD-10-CM

## 2025-05-14 DIAGNOSIS — Z12.5 SCREENING FOR PROSTATE CANCER: ICD-10-CM

## 2025-05-27 ENCOUNTER — OFFICE VISIT (OUTPATIENT)
Dept: FAMILY MEDICINE CLINIC | Facility: CLINIC | Age: 70
End: 2025-05-27
Payer: MEDICARE

## 2025-05-27 VITALS
DIASTOLIC BLOOD PRESSURE: 70 MMHG | WEIGHT: 202 LBS | HEIGHT: 70 IN | HEART RATE: 76 BPM | OXYGEN SATURATION: 97 % | BODY MASS INDEX: 28.92 KG/M2 | TEMPERATURE: 97.9 F | SYSTOLIC BLOOD PRESSURE: 110 MMHG

## 2025-05-27 DIAGNOSIS — Z12.5 SCREENING FOR PROSTATE CANCER: ICD-10-CM

## 2025-05-27 DIAGNOSIS — Z00.00 MEDICARE ANNUAL WELLNESS VISIT, SUBSEQUENT: Primary | ICD-10-CM

## 2025-05-27 DIAGNOSIS — M05.79 RHEUMATOID ARTHRITIS INVOLVING MULTIPLE SITES WITH POSITIVE RHEUMATOID FACTOR: ICD-10-CM

## 2025-05-27 PROCEDURE — 1160F RVW MEDS BY RX/DR IN RCRD: CPT | Performed by: FAMILY MEDICINE

## 2025-05-27 PROCEDURE — 1126F AMNT PAIN NOTED NONE PRSNT: CPT | Performed by: FAMILY MEDICINE

## 2025-05-27 PROCEDURE — G0439 PPPS, SUBSEQ VISIT: HCPCS | Performed by: FAMILY MEDICINE

## 2025-05-27 PROCEDURE — 1159F MED LIST DOCD IN RCRD: CPT | Performed by: FAMILY MEDICINE

## 2025-05-27 RX ORDER — DIPHENOXYLATE HYDROCHLORIDE AND ATROPINE SULFATE 2.5; .025 MG/1; MG/1
1 TABLET ORAL DAILY
COMMUNITY

## 2025-05-27 NOTE — ASSESSMENT & PLAN NOTE
Orders:    CBC (No Diff); Future    Comprehensive Metabolic Panel; Future    Urinalysis With Microscopic If Indicated (No Culture) - Urine, Clean Catch; Future

## 2025-05-27 NOTE — PROGRESS NOTES
Subjective   The ABCs of the Annual Wellness Visit  Medicare Wellness Visit      Jimi De La Vega is a 70 y.o. patient who presents for a Medicare Wellness Visit.    The following portions of the patient's history were reviewed and   updated as appropriate: allergies, current medications, past family history, past medical history, past social history, past surgical history, and problem list.    Compared to one year ago, the patient's physical   health is the same.  Compared to one year ago, the patient's mental   health is the same.    Recent Hospitalizations:  He was not admitted to the hospital during the last year.     Current Medical Providers:  Patient Care Team:  Neal Jalloh MD as PCP - General  Reyna, Dion Butterfield MD as Consulting Physician (Dermatology)  Josh Espinal MD as Consulting Physician (Ophthalmology)  Reginaldo Eugene MD as Consulting Physician (Gastroenterology)  Azra Dia MD as Consulting Physician (Rheumatology)  Alana Carmen APRN as Nurse Practitioner (Orthopedic Surgery)    Outpatient Medications Prior to Visit   Medication Sig Dispense Refill    hydroxychloroquine (PLAQUENIL) 200 MG tablet Take 1 tablet by mouth 2 (Two) Times a Day.      ketoconazole (NIZORAL) 2 % cream Apply  topically to the appropriate area as directed Every 12 (Twelve) Hours. 30 g 1    multivitamin (MULTI VITAMIN DAILY PO) Take 1 tablet by mouth Daily.      azithromycin (Zithromax Z-Anuj) 250 MG tablet Take 2 tablets by mouth on day 1, then 1 tablet daily on days 2-5 6 tablet 0     No facility-administered medications prior to visit.     No opioid medication identified on active medication list. I have reviewed chart for other potential  high risk medication/s and harmful drug interactions in the elderly.      Aspirin is not on active medication list.  Aspirin use is not indicated based on review of current medical condition/s. Risk of harm outweighs potential benefits.  .    Patient  "Active Problem List   Diagnosis    Carpal tunnel syndrome of right wrist    Rheumatoid arthritis involving multiple sites with positive rheumatoid factor    Medicare annual wellness visit, subsequent    Screening for prostate cancer    Screening for colon cancer    Vitiligo    Personal history of colonic polyps    Chronic pain of right knee    Primary osteoarthritis of right knee     Advance Care Planning Advance Directive is not on file.  ACP discussion was held with the patient during this visit. Patient has an advance directive (not in EMR), copy requested.            Objective   Vitals:    05/27/25 0844   BP: 110/70   BP Location: Left arm   Patient Position: Sitting   Cuff Size: Adult   Pulse: 76   Temp: 97.9 °F (36.6 °C)   SpO2: 97%   Weight: 91.6 kg (202 lb)   Height: 177.8 cm (70\")   PainSc: 0-No pain       Estimated body mass index is 28.98 kg/m² as calculated from the following:    Height as of this encounter: 177.8 cm (70\").    Weight as of this encounter: 91.6 kg (202 lb).    BMI is >= 25 and <30. (Overweight) The following options were offered after discussion;: nutrition counseling/recommendations           Does the patient have evidence of cognitive impairment? No                                                                                               Health  Risk Assessment    Smoking Status:  Social History     Tobacco Use   Smoking Status Never    Passive exposure: Never   Smokeless Tobacco Never   Tobacco Comments    None     Alcohol Consumption:  Social History     Substance and Sexual Activity   Alcohol Use Yes    Alcohol/week: 12.0 standard drinks of alcohol    Types: 1 Glasses of wine, 1 Cans of beer, 10 Drinks containing 0.5 oz of alcohol per week    Comment: I have 1 or 2 a day       Fall Risk Screen  ROSALEE Fall Risk Assessment was completed, and patient is at MODERATE risk for falls. Assessment completed on:5/27/2025    Depression Screening   Little interest or pleasure in doing " things? Not at all   Feeling down, depressed, or hopeless? Not at all   PHQ-2 Total Score 0      Health Habits and Functional and Cognitive Screenin/21/2025     5:02 PM   Functional & Cognitive Status   Do you have difficulty preparing food and eating? No   Do you have difficulty bathing yourself, getting dressed or grooming yourself? No   Do you have difficulty using the toilet? No   Do you have difficulty moving around from place to place? No   Do you have trouble with steps or getting out of a bed or a chair? No   Current Diet Limited Junk Food   Dental Exam Up to date   Eye Exam Up to date   Exercise (times per week) 4 times per week   Current Exercises Include Hiking;Home Exercise Program (TV, Computer, Etc.);Home Fitness Gym;House Cleaning;Light Weights;Walking;Weightlifting;Yard Work   Do you need help using the phone?  No   Are you deaf or do you have serious difficulty hearing?  No   Do you need help to go to places out of walking distance? No   Do you need help shopping? No   Do you need help preparing meals?  No   Do you need help with housework?  No   Do you need help with laundry? No   Do you need help taking your medications? No   Do you need help managing money? No   Do you ever drive or ride in a car without wearing a seat belt? No   Have you felt unusual stress, anger or loneliness in the last month? No   Who do you live with? Spouse   If you need help, do you have trouble finding someone available to you? No   Have you been bothered in the last four weeks by sexual problems? No   Do you have difficulty concentrating, remembering or making decisions? No           Age-appropriate Screening Schedule:  Refer to the list below for future screening recommendations based on patient's age, sex and/or medical conditions. Orders for these recommended tests are listed in the plan section. The patient has been provided with a written plan.    Health Maintenance List  Health Maintenance   Topic Date  Due    ANNUAL WELLNESS VISIT  05/13/2025    COVID-19 Vaccine (4 - 2024-25 season) 11/27/2025 (Originally 9/1/2024)    HEPATITIS C SCREENING  05/22/2026 (Originally 2/9/2016)    INFLUENZA VACCINE  07/01/2025    COLORECTAL CANCER SCREENING  11/08/2026    TDAP/TD VACCINES (3 - Td or Tdap) 02/14/2029    Pneumococcal Vaccine 50+  Completed    ZOSTER VACCINE  Discontinued                                                                                                                                                CMS Preventative Services Quick Reference  Risk Factors Identified During Encounter  None Identified    The above risks/problems have been discussed with the patient.  Pertinent information has been shared with the patient in the After Visit Summary.  An After Visit Summary and PPPS were made available to the patient.    Follow Up:   Next Medicare Wellness visit to be scheduled in 1 year.     Assessment & Plan  Medicare annual wellness visit, subsequent         Rheumatoid arthritis involving multiple sites with positive rheumatoid factor    Orders:    CBC (No Diff); Future    Comprehensive Metabolic Panel; Future    Urinalysis With Microscopic If Indicated (No Culture) - Urine, Clean Catch; Future    Screening for prostate cancer    Orders:    PSA Screen; Future         Follow Up:   No follow-ups on file.    Alexis is doing very well  His rheumatoid arthritis well-managed with Plaquenil.  Up-to-date with rheumatology  Labs reviewed all normal  [facility]ing penitentiary  Receiving gel injections in knees

## (undated) DEVICE — HYBRID TUBING/CAP SET FOR OLYMPUS® SCOPES: Brand: ERBE

## (undated) DEVICE — SUCTION CANISTER, 3000CC,SAFELINER: Brand: DEROYAL

## (undated) DEVICE — Device

## (undated) DEVICE — VIAL FORMLN CAP 10PCT 20ML

## (undated) DEVICE — ADAPT CLN SCPE ENDO PORPOISE BX/50 DISP

## (undated) DEVICE — SNAR POLYP CAPTIVATOR2 RND 2.4 240CM 33

## (undated) DEVICE — GLV SURG SENSICARE PI MIC PF SZ7.5 LF STRL

## (undated) DEVICE — KT ORCA ORCAPOD DISP STRL

## (undated) DEVICE — BW-412T DISP COMBO CLEANING BRUSH: Brand: SINGLE USE COMBINATION CLEANING BRUSH

## (undated) DEVICE — GOWN ISOL W/THUMB UNIV BLU BX/15

## (undated) DEVICE — FLEX ADVANTAGE 1500CC: Brand: FLEX ADVANTAGE

## (undated) DEVICE — JACKT LAB F/R KNIT CUFF/COLR XLG BLU

## (undated) DEVICE — TRAP POLYP 4CHAMBER

## (undated) DEVICE — FRCP BX RADJAW4 NDL 2.8 240CM LG OG BX40

## (undated) DEVICE — SPNG GZ WOVN 4X4IN 12PLY 10/BX STRL

## (undated) DEVICE — VIAL FORMALIN CAP 10P 40ML

## (undated) DEVICE — SYR LL 3CC

## (undated) DEVICE — CANN O2 ETCO2 FITS ALL CONN CO2 SMPL A/ 7IN DISP LF

## (undated) DEVICE — SINGLE-USE BIOPSY FORCEPS: Brand: RADIAL JAW 4